# Patient Record
Sex: MALE | Race: WHITE | Employment: OTHER | URBAN - METROPOLITAN AREA
[De-identification: names, ages, dates, MRNs, and addresses within clinical notes are randomized per-mention and may not be internally consistent; named-entity substitution may affect disease eponyms.]

---

## 2021-08-27 ENCOUNTER — HOSPITAL ENCOUNTER (INPATIENT)
Age: 71
LOS: 10 days | Discharge: HOME | DRG: 177 | End: 2021-09-06
Attending: EMERGENCY MEDICINE | Admitting: HOSPITALIST
Payer: MEDICARE

## 2021-08-27 ENCOUNTER — APPOINTMENT (OUTPATIENT)
Dept: GENERAL RADIOLOGY | Age: 71
DRG: 177 | End: 2021-08-27
Attending: EMERGENCY MEDICINE
Payer: MEDICARE

## 2021-08-27 DIAGNOSIS — D69.6 THROMBOCYTOPENIA (HCC): ICD-10-CM

## 2021-08-27 DIAGNOSIS — R09.02 HYPOXIA: ICD-10-CM

## 2021-08-27 DIAGNOSIS — U07.1 PNEUMONIA DUE TO COVID-19 VIRUS: Primary | ICD-10-CM

## 2021-08-27 DIAGNOSIS — J12.82 PNEUMONIA DUE TO COVID-19 VIRUS: Primary | ICD-10-CM

## 2021-08-27 PROBLEM — J96.01 ACUTE RESPIRATORY FAILURE WITH HYPOXIA (HCC): Status: ACTIVE | Noted: 2021-08-27

## 2021-08-27 PROBLEM — I10 HYPERTENSION: Status: ACTIVE | Noted: 2021-08-27

## 2021-08-27 LAB
ALBUMIN SERPL-MCNC: 2.5 G/DL (ref 3.2–4.6)
ALBUMIN/GLOB SERPL: 0.6 {RATIO} (ref 1.2–3.5)
ALP SERPL-CCNC: 54 U/L (ref 50–136)
ALT SERPL-CCNC: 24 U/L (ref 12–65)
ANION GAP SERPL CALC-SCNC: 10 MMOL/L (ref 7–16)
AST SERPL-CCNC: 46 U/L (ref 15–37)
BASOPHILS # BLD: 0 K/UL (ref 0–0.2)
BASOPHILS NFR BLD: 0 % (ref 0–2)
BILIRUB SERPL-MCNC: 0.4 MG/DL (ref 0.2–1.1)
BUN SERPL-MCNC: 15 MG/DL (ref 8–23)
CALCIUM SERPL-MCNC: 8.7 MG/DL (ref 8.3–10.4)
CHLORIDE SERPL-SCNC: 97 MMOL/L (ref 98–107)
CO2 SERPL-SCNC: 25 MMOL/L (ref 21–32)
CREAT SERPL-MCNC: 0.97 MG/DL (ref 0.8–1.5)
D DIMER PPP FEU-MCNC: 1.72 UG/ML(FEU)
DIFFERENTIAL METHOD BLD: ABNORMAL
EOSINOPHIL # BLD: 0 K/UL (ref 0–0.8)
EOSINOPHIL NFR BLD: 0 % (ref 0.5–7.8)
ERYTHROCYTE [DISTWIDTH] IN BLOOD BY AUTOMATED COUNT: 12.6 % (ref 11.9–14.6)
GLOBULIN SER CALC-MCNC: 4.1 G/DL (ref 2.3–3.5)
GLUCOSE SERPL-MCNC: 239 MG/DL (ref 65–100)
HCT VFR BLD AUTO: 44.2 % (ref 41.1–50.3)
HGB BLD-MCNC: 15.2 G/DL (ref 13.6–17.2)
IMM GRANULOCYTES # BLD AUTO: 0 K/UL (ref 0–0.5)
IMM GRANULOCYTES NFR BLD AUTO: 1 % (ref 0–5)
LACTATE SERPL-SCNC: 2.3 MMOL/L (ref 0.4–2)
LYMPHOCYTES # BLD: 0.5 K/UL (ref 0.5–4.6)
LYMPHOCYTES NFR BLD: 8 % (ref 13–44)
MAGNESIUM SERPL-MCNC: 1.7 MG/DL (ref 1.8–2.4)
MCH RBC QN AUTO: 29.5 PG (ref 26.1–32.9)
MCHC RBC AUTO-ENTMCNC: 34.4 G/DL (ref 31.4–35)
MCV RBC AUTO: 85.8 FL (ref 79.6–97.8)
MONOCYTES # BLD: 0.1 K/UL (ref 0.1–1.3)
MONOCYTES NFR BLD: 2 % (ref 4–12)
NEUTS SEG # BLD: 6.5 K/UL (ref 1.7–8.2)
NEUTS SEG NFR BLD: 90 % (ref 43–78)
NRBC # BLD: 0 K/UL (ref 0–0.2)
PLATELET # BLD AUTO: 26 K/UL (ref 150–450)
PMV BLD AUTO: 11.6 FL (ref 9.4–12.3)
POTASSIUM SERPL-SCNC: 3.5 MMOL/L (ref 3.5–5.1)
PROT SERPL-MCNC: 6.6 G/DL (ref 6.3–8.2)
RBC # BLD AUTO: 5.15 M/UL (ref 4.23–5.6)
SODIUM SERPL-SCNC: 132 MMOL/L (ref 136–145)
TROPONIN-HIGH SENSITIVITY: 48.3 PG/ML (ref 0–14)
WBC # BLD AUTO: 7.2 K/UL (ref 4.3–11.1)

## 2021-08-27 PROCEDURE — 96374 THER/PROPH/DIAG INJ IV PUSH: CPT

## 2021-08-27 PROCEDURE — 85025 COMPLETE CBC W/AUTO DIFF WBC: CPT

## 2021-08-27 PROCEDURE — 85379 FIBRIN DEGRADATION QUANT: CPT

## 2021-08-27 PROCEDURE — 84484 ASSAY OF TROPONIN QUANT: CPT

## 2021-08-27 PROCEDURE — 74011250636 HC RX REV CODE- 250/636: Performed by: EMERGENCY MEDICINE

## 2021-08-27 PROCEDURE — 83605 ASSAY OF LACTIC ACID: CPT

## 2021-08-27 PROCEDURE — 86738 MYCOPLASMA ANTIBODY: CPT

## 2021-08-27 PROCEDURE — 93005 ELECTROCARDIOGRAM TRACING: CPT | Performed by: EMERGENCY MEDICINE

## 2021-08-27 PROCEDURE — XW033E5 INTRODUCTION OF REMDESIVIR ANTI-INFECTIVE INTO PERIPHERAL VEIN, PERCUTANEOUS APPROACH, NEW TECHNOLOGY GROUP 5: ICD-10-PCS | Performed by: HOSPITALIST

## 2021-08-27 PROCEDURE — 80053 COMPREHEN METABOLIC PANEL: CPT

## 2021-08-27 PROCEDURE — 71045 X-RAY EXAM CHEST 1 VIEW: CPT

## 2021-08-27 PROCEDURE — 65270000029 HC RM PRIVATE

## 2021-08-27 PROCEDURE — 83735 ASSAY OF MAGNESIUM: CPT

## 2021-08-27 PROCEDURE — 99285 EMERGENCY DEPT VISIT HI MDM: CPT

## 2021-08-27 RX ORDER — ACETAMINOPHEN 325 MG/1
650 TABLET ORAL
Status: DISCONTINUED | OUTPATIENT
Start: 2021-08-27 | End: 2021-09-06 | Stop reason: HOSPADM

## 2021-08-27 RX ORDER — SODIUM CHLORIDE 0.9 % (FLUSH) 0.9 %
5-40 SYRINGE (ML) INJECTION EVERY 8 HOURS
Status: DISCONTINUED | OUTPATIENT
Start: 2021-08-27 | End: 2021-09-06 | Stop reason: HOSPADM

## 2021-08-27 RX ORDER — INSULIN LISPRO 100 [IU]/ML
INJECTION, SOLUTION INTRAVENOUS; SUBCUTANEOUS EVERY 4 HOURS
Status: DISCONTINUED | OUTPATIENT
Start: 2021-08-27 | End: 2021-08-28

## 2021-08-27 RX ORDER — DEXAMETHASONE SODIUM PHOSPHATE 100 MG/10ML
6 INJECTION INTRAMUSCULAR; INTRAVENOUS
Status: COMPLETED | OUTPATIENT
Start: 2021-08-27 | End: 2021-08-27

## 2021-08-27 RX ORDER — POLYETHYLENE GLYCOL 3350 17 G/17G
17 POWDER, FOR SOLUTION ORAL DAILY PRN
Status: DISCONTINUED | OUTPATIENT
Start: 2021-08-27 | End: 2021-09-06 | Stop reason: HOSPADM

## 2021-08-27 RX ORDER — SODIUM CHLORIDE 0.9 % (FLUSH) 0.9 %
5-40 SYRINGE (ML) INJECTION AS NEEDED
Status: DISCONTINUED | OUTPATIENT
Start: 2021-08-27 | End: 2021-09-06 | Stop reason: HOSPADM

## 2021-08-27 RX ORDER — DEXAMETHASONE SODIUM PHOSPHATE 100 MG/10ML
6 INJECTION INTRAMUSCULAR; INTRAVENOUS EVERY 24 HOURS
Status: DISCONTINUED | OUTPATIENT
Start: 2021-08-28 | End: 2021-08-28

## 2021-08-27 RX ORDER — DEXAMETHASONE SODIUM PHOSPHATE 100 MG/10ML
6 INJECTION INTRAMUSCULAR; INTRAVENOUS EVERY 24 HOURS
Status: DISCONTINUED | OUTPATIENT
Start: 2021-08-27 | End: 2021-08-27

## 2021-08-27 RX ORDER — ONDANSETRON 8 MG/1
4 TABLET, ORALLY DISINTEGRATING ORAL
Status: DISCONTINUED | OUTPATIENT
Start: 2021-08-27 | End: 2021-09-06 | Stop reason: HOSPADM

## 2021-08-27 RX ORDER — ACETAMINOPHEN 650 MG/1
650 SUPPOSITORY RECTAL
Status: DISCONTINUED | OUTPATIENT
Start: 2021-08-27 | End: 2021-09-06 | Stop reason: HOSPADM

## 2021-08-27 RX ORDER — ONDANSETRON 2 MG/ML
4 INJECTION INTRAMUSCULAR; INTRAVENOUS
Status: DISCONTINUED | OUTPATIENT
Start: 2021-08-27 | End: 2021-09-06 | Stop reason: HOSPADM

## 2021-08-27 RX ADMIN — DEXAMETHASONE SODIUM PHOSPHATE 6 MG: 10 INJECTION INTRAMUSCULAR; INTRAVENOUS at 22:01

## 2021-08-28 PROBLEM — E11.9 DM (DIABETES MELLITUS) (HCC): Status: ACTIVE | Noted: 2021-08-28

## 2021-08-28 LAB
ATRIAL RATE: 104 BPM
CALCULATED P AXIS, ECG09: 32 DEGREES
CALCULATED R AXIS, ECG10: -41 DEGREES
CALCULATED T AXIS, ECG11: 22 DEGREES
DIAGNOSIS, 93000: NORMAL
FERRITIN SERPL-MCNC: 1546 NG/ML (ref 8–388)
GLUCOSE BLD STRIP.AUTO-MCNC: 276 MG/DL (ref 65–100)
GLUCOSE BLD STRIP.AUTO-MCNC: 290 MG/DL (ref 65–100)
GLUCOSE BLD STRIP.AUTO-MCNC: 311 MG/DL (ref 65–100)
GLUCOSE BLD STRIP.AUTO-MCNC: 330 MG/DL (ref 65–100)
GLUCOSE BLD STRIP.AUTO-MCNC: 337 MG/DL (ref 65–100)
GLUCOSE BLD STRIP.AUTO-MCNC: 361 MG/DL (ref 65–100)
LACTATE SERPL-SCNC: 1.9 MMOL/L (ref 0.4–2)
P-R INTERVAL, ECG05: 154 MS
PROCALCITONIN SERPL-MCNC: 2.01 NG/ML
Q-T INTERVAL, ECG07: 364 MS
QRS DURATION, ECG06: 92 MS
QTC CALCULATION (BEZET), ECG08: 478 MS
SERVICE CMNT-IMP: ABNORMAL
TROPONIN-HIGH SENSITIVITY: 54.8 PG/ML (ref 0–14)
VENTRICULAR RATE, ECG03: 104 BPM

## 2021-08-28 PROCEDURE — 65270000029 HC RM PRIVATE

## 2021-08-28 PROCEDURE — 74011000258 HC RX REV CODE- 258: Performed by: HOSPITALIST

## 2021-08-28 PROCEDURE — 74011636637 HC RX REV CODE- 636/637: Performed by: HOSPITALIST

## 2021-08-28 PROCEDURE — 86738 MYCOPLASMA ANTIBODY: CPT

## 2021-08-28 PROCEDURE — 82728 ASSAY OF FERRITIN: CPT

## 2021-08-28 PROCEDURE — 84145 PROCALCITONIN (PCT): CPT

## 2021-08-28 PROCEDURE — 74011250636 HC RX REV CODE- 250/636: Performed by: HOSPITALIST

## 2021-08-28 PROCEDURE — 74011000250 HC RX REV CODE- 250: Performed by: HOSPITALIST

## 2021-08-28 PROCEDURE — 74011636637 HC RX REV CODE- 636/637: Performed by: NURSE PRACTITIONER

## 2021-08-28 PROCEDURE — 82962 GLUCOSE BLOOD TEST: CPT

## 2021-08-28 PROCEDURE — 36415 COLL VENOUS BLD VENIPUNCTURE: CPT

## 2021-08-28 PROCEDURE — 77010033678 HC OXYGEN DAILY

## 2021-08-28 PROCEDURE — 94760 N-INVAS EAR/PLS OXIMETRY 1: CPT

## 2021-08-28 RX ORDER — INSULIN LISPRO 100 [IU]/ML
INJECTION, SOLUTION INTRAVENOUS; SUBCUTANEOUS
Status: DISCONTINUED | OUTPATIENT
Start: 2021-08-28 | End: 2021-09-06 | Stop reason: HOSPADM

## 2021-08-28 RX ORDER — ASCORBIC ACID 500 MG
1000 TABLET ORAL 3 TIMES DAILY
COMMUNITY

## 2021-08-28 RX ORDER — DEXAMETHASONE 4 MG/1
6 TABLET ORAL DAILY
Status: DISCONTINUED | OUTPATIENT
Start: 2021-08-29 | End: 2021-08-30

## 2021-08-28 RX ORDER — METFORMIN HYDROCHLORIDE 500 MG/1
500 TABLET ORAL 2 TIMES DAILY WITH MEALS
Status: ON HOLD | COMMUNITY
End: 2021-09-06 | Stop reason: SDUPTHER

## 2021-08-28 RX ORDER — INSULIN GLARGINE 100 [IU]/ML
15 INJECTION, SOLUTION SUBCUTANEOUS
Status: DISCONTINUED | OUTPATIENT
Start: 2021-08-28 | End: 2021-08-29

## 2021-08-28 RX ADMIN — Medication 10 ML: at 01:32

## 2021-08-28 RX ADMIN — REMDESIVIR 200 MG: 100 INJECTION, POWDER, LYOPHILIZED, FOR SOLUTION INTRAVENOUS at 01:31

## 2021-08-28 RX ADMIN — CEFTRIAXONE 2 G: 2 INJECTION, POWDER, FOR SOLUTION INTRAMUSCULAR; INTRAVENOUS at 01:16

## 2021-08-28 RX ADMIN — Medication 10 ML: at 14:04

## 2021-08-28 RX ADMIN — DOXYCYCLINE 100 MG: 100 INJECTION, POWDER, LYOPHILIZED, FOR SOLUTION INTRAVENOUS at 14:04

## 2021-08-28 RX ADMIN — DOXYCYCLINE 100 MG: 100 INJECTION, POWDER, LYOPHILIZED, FOR SOLUTION INTRAVENOUS at 02:28

## 2021-08-28 RX ADMIN — INSULIN LISPRO 8 UNITS: 100 INJECTION, SOLUTION INTRAVENOUS; SUBCUTANEOUS at 12:24

## 2021-08-28 RX ADMIN — DEXAMETHASONE SODIUM PHOSPHATE 6 MG: 10 INJECTION, SOLUTION INTRAMUSCULAR; INTRAVENOUS at 08:27

## 2021-08-28 RX ADMIN — INSULIN LISPRO 8 UNITS: 100 INJECTION, SOLUTION INTRAVENOUS; SUBCUTANEOUS at 08:30

## 2021-08-28 RX ADMIN — INSULIN LISPRO 6 UNITS: 100 INJECTION, SOLUTION INTRAVENOUS; SUBCUTANEOUS at 04:25

## 2021-08-28 RX ADMIN — INSULIN LISPRO 6 UNITS: 100 INJECTION, SOLUTION INTRAVENOUS; SUBCUTANEOUS at 01:30

## 2021-08-28 RX ADMIN — CEFTRIAXONE 2 G: 2 INJECTION, POWDER, FOR SOLUTION INTRAMUSCULAR; INTRAVENOUS at 22:20

## 2021-08-28 RX ADMIN — INSULIN LISPRO 8 UNITS: 100 INJECTION, SOLUTION INTRAVENOUS; SUBCUTANEOUS at 23:14

## 2021-08-28 RX ADMIN — Medication 10 ML: at 05:54

## 2021-08-28 RX ADMIN — REMDESIVIR 100 MG: 100 INJECTION, POWDER, LYOPHILIZED, FOR SOLUTION INTRAVENOUS at 00:00

## 2021-08-28 RX ADMIN — INSULIN GLARGINE 15 UNITS: 100 INJECTION, SOLUTION SUBCUTANEOUS at 22:18

## 2021-08-28 RX ADMIN — REMDESIVIR 100 MG: 100 INJECTION, POWDER, LYOPHILIZED, FOR SOLUTION INTRAVENOUS at 23:18

## 2021-08-28 RX ADMIN — Medication 10 ML: at 21:44

## 2021-08-28 RX ADMIN — INSULIN LISPRO 10 UNITS: 100 INJECTION, SOLUTION INTRAVENOUS; SUBCUTANEOUS at 17:34

## 2021-08-28 NOTE — PROGRESS NOTES
Pt arrived to unit via stretcher from ED. Condition stable. Pt alert and oriented to person. Disoriented to place or time. Staff oriented patient to place, time, and situation. Plan of care reviewed with patient. Pt agrees to plan of care, verbalizing understanding with reinforcement. Assessment completed, see flowsheet. Admission criteria reviewed. Call light placed in reach. Bed low and locked. Pt denies any needs at this time. Will continue to monitor.

## 2021-08-28 NOTE — PROGRESS NOTES
TRANSFER - IN REPORT:    Verbal report received from Rupinder Mcneil RN on Prabha Faustin  being received from ED for routine progression of care      Report consisted of patients Situation, Background, Assessment and   Recommendations(SBAR). Information from the following report(s) Kardex and ED Summary was reviewed with the receiving nurse. Opportunity for questions and clarification was provided. Assessment completed upon patients arrival to unit and care assumed.

## 2021-08-28 NOTE — PROGRESS NOTES
08/28/21 0502   Dual Skin Pressure Injury Assessment   Dual Skin Pressure Injury Assessment X   Second Care Provider (Based on [address] [place] [address])   (Jakob Díaz, RN)   Buttocks/Ishium  Bilateral   Skin Integumentary   Skin Integumentary (WDL) X    Pressure  Injury Documentation No Pressure Injury Noted-Pressure Ulcer Prevention Initiated   Skin Color Appropriate for ethnicity   Skin Condition/Temp Warm   Turgor Epidermis thin w/ loss of subcut tissue   Hair Growth Present   Nails X   Exceptions to WDL Mycotic left;Mycotic right

## 2021-08-28 NOTE — ED NOTES
TRANSFER - OUT REPORT:    Verbal report given to Deepti(name) on Cldonald Webb  being transferred to 603(unit) for routine progression of care       Report consisted of patients Situation, Background, Assessment and   Recommendations(SBAR). Information from the following report(s) SBAR, MAR and Recent Results was reviewed with the receiving nurse. Lines:   Peripheral IV 08/27/21 Right Antecubital (Active)        Opportunity for questions and clarification was provided.       Patient transported with:   O2 @ 6 liters

## 2021-08-28 NOTE — ED NOTES
Pt arrives via EMS [facility] with a CC of ShoB and generalized weakness. Tested positive for Covid 4 days ago. This morning woke with fatigue. Found with o2 sat 85% with wheezing. En route given 1g Rocephin, and 4L NC. Arrives 96% on RA.

## 2021-08-28 NOTE — PROGRESS NOTES
INITIAL SPIRITUAL ASSESSMENT     NOTED:    No doni preference    Spouse - kiara    Lives in [facility]    Full code    No directives on file    High risk for decline          WILL ASSESS HOW WE CAN BEST SERVE THIS FAMILY

## 2021-08-28 NOTE — PROGRESS NOTES
08/28/21 0502   Dual Skin Pressure Injury Assessment   Dual Skin Pressure Injury Assessment X   Second Care Provider (Based on [address] [place] [address])   (Ngoc Crisostomo RN)   Buttocks/Ishium  Bilateral  (red, blanchable to bilateral buttocks)   Skin Integumentary   Skin Integumentary (WDL) X    Pressure  Injury Documentation No Pressure Injury Noted-Pressure Ulcer Prevention Initiated   Skin Color Appropriate for ethnicity   Skin Condition/Temp Warm   Turgor Epidermis thin w/ loss of subcut tissue   Hair Growth Present   Nails X   Exceptions to WDL Mycotic left;Mycotic right   Wound Prevention and Protection Methods   Orientation of Wound Prevention Posterior   Location of Wound Prevention Buttocks; Sacrum/Coccyx   Dressing Present  No   Wound Offloading (Prevention Methods) Repositioning;Pillows   skin intact

## 2021-08-28 NOTE — ED PROVIDER NOTES
58-year-old male has been sick with cough, congestion, fevers, nausea, vomiting, and diarrhea for the past week. He was diagnosed with Covid 4 days ago [place] [facility] He has worsening shortness of breath and generalized weakness. EMS noted sats of 85% upon arrival.  He is not on home oxygen. He is between 92 and 96% on 4 L. Given Rocephin by EMS. Denies any underlying lung or heart disease. He has received Covid vaccine. Shortness of Breath  Associated symptoms include a fever, headaches, cough, chest pain, vomiting and abdominal pain. Pertinent negatives include no rash. No past medical history on file. No past surgical history on file. No family history on file. Social History     Socioeconomic History    Marital status: Not on file     Spouse name: Not on file    Number of children: Not on file    Years of education: Not on file    Highest education level: Not on file   Occupational History    Not on file   Tobacco Use    Smoking status: Not on file   Substance and Sexual Activity    Alcohol use: Not on file    Drug use: Not on file    Sexual activity: Not on file   Other Topics Concern    Not on file   Social History Narrative    Not on file     Social Determinants of Health     Financial Resource Strain:     Difficulty of Paying Living Expenses:    Food Insecurity:     Worried About Running Out of Food in the Last Year:     [address] in the Last Year:    Transportation Needs:     Lack of Transportation (Medical):      Lack of Transportation (Non-Medical):    Physical Activity:     Days of Exercise per Week:     Minutes of Exercise per Session:    Stress:     Feeling of Stress :    Social Connections:     Frequency of Communication with Friends and Family:     Frequency of Social Gatherings with Friends and Family:     Attends Taoist Services:     Active Member of Clubs or Organizations:     Attends Club or Organization Meetings:     Marital Status:    Intimate Partner Violence:     Fear of Current or Ex-Partner:     Emotionally Abused:     Physically Abused:     Sexually Abused: ALLERGIES: Patient has no known allergies. Review of Systems   Constitutional: Positive for activity change, appetite change, chills, fatigue and fever. HENT: Positive for congestion. Negative for hearing loss. Eyes: Negative for visual disturbance. Respiratory: Positive for cough and shortness of breath. Cardiovascular: Positive for chest pain. Gastrointestinal: Positive for abdominal pain, diarrhea, nausea and vomiting. Musculoskeletal: Positive for arthralgias and myalgias. Skin: Negative for rash. Neurological: Positive for headaches. Psychiatric/Behavioral: Negative for confusion. All other systems reviewed and are negative. Vitals:    08/27/21 2135 08/27/21 2136 08/27/21 2137   BP: 137/68     Pulse: (!) 101 (!) 105 (!) 103   Resp:  25 (!) 32   Temp: 97.4 °F (36.3 °C)     SpO2: 94%  93%   Weight: 95.3 kg (210 lb)     Height: 5' 6\" (1.676 m)              Physical Exam  Vitals and nursing note reviewed. Constitutional:       Appearance: Normal appearance. He is ill-appearing. HENT:      Head: Normocephalic and atraumatic. Nose: Nose normal.      Mouth/Throat:      Mouth: Mucous membranes are moist.   Eyes:      Pupils: Pupils are equal, round, and reactive to light. Cardiovascular:      Rate and Rhythm: Regular rhythm. Tachycardia present. Pulmonary:      Effort: Pulmonary effort is normal. Tachypnea present. Breath sounds: No stridor. Abdominal:      General: Abdomen is flat. Musculoskeletal:         General: No deformity. Normal range of motion. Cervical back: Normal range of motion and neck supple. Skin:     General: Skin is warm and dry. Neurological:      General: No focal deficit present. Mental Status: He is alert. Mental status is at baseline.    Psychiatric:         Mood and Affect: Mood normal.         Behavior: Behavior normal.          MDM  Number of Diagnoses or Management Options  Diagnosis management comments: Parts of this document were created using dragon voice recognition software. The chart has been reviewed but errors may still be present. I wore appropriate PPE throughout this patient's ED visit. Mike Mcmahan MD, 9:46 PM    Given decadron.  Will admit       Amount and/or Complexity of Data Reviewed  Clinical lab tests: reviewed and ordered  Tests in the radiology section of CPT®: ordered and reviewed  Tests in the medicine section of CPT®: ordered and reviewed           EKG    Date/Time: 8/27/2021 9:46 PM  Performed by: Barbara De Paz MD  Authorized by: Barbara De Paz MD     ECG reviewed by ED Physician in the absence of a cardiologist: yes    Interpretation:     Interpretation: abnormal    Rate:     ECG rate:  104    ECG rate assessment: tachycardic    Rhythm:     Rhythm: sinus tachycardia    Ectopy:     Ectopy: PAC    Conduction:     Conduction: normal    ST segments:     ST segments:  Non-specific

## 2021-08-28 NOTE — PROGRESS NOTES
[facility] Hospitalist Progress Note     Name:  Chaneta Pallas  Age:70 y.o. Sex:male   :  1950    MRN:  [id number]     Admit Date:  2021    Reason for Admission:  Pneumonia due to COVID-19 virus [U07.1, J12.82]  Hypertension [I10]    Hospital Course/Interval history:     69year old CM PMH DM, HTN has no PCP admitted  for acute respiratory failure with hypoxia. He was dx with COVID 19  at outpatient. Per EMS, he was noted with saturations mid 80s, does not require oxygen at baseline. He has had 1 vaccine with 2nd due next week. Subjective (21): Alert and oriented x3. Oxygen weaned down to 3 liters by this provider, saturations mid 90s. Afebrile. Platelets 26. **Joe Ott, spouse, called with update. All questions answered. Assessment & Plan     1. Acute respiratory failure with hypoxia related to COVID 19 infection:  Dx , requiring oxygen which is new for him  Started on Rocephin and Doxycycline on admission, will check procal, leigionella and mycoplasma, strep pending. With de-escalate ABT if above negative  Wean oxygen as able  Dexamethasone 6 mg oral daily  Day 1 of Remdesivir    2. DM2:  A1C on  from doctor office noted 12  SSI   Adding Lantus insulin 15 units nightly. *Patient on Dexamethasone so expect increase in BS    3. Essential HTN:  Well controlled, not on any medications. Trend    4. Thrombocytopenia:  Noted 26  On  noted 98  Likely related to [place] 19 infetion  Patient states having a H/O same over 10 years ago, will check peripheral smear  Trend for now, transfuse if less than 10        Diet:  DIET ADULT ORAL NUTRITION SUPPLEMENT  DIET ADULT  DVT PPx: Lovenox SQ  Code status: Full Code  Disposition/Expected LOS: 1-2 days          Review of Systems: 14 point review of systems is otherwise negative with the exception of the elements mentioned above.     Objective:     Patient Vitals for the past 24 hrs:   Temp Pulse Resp BP SpO2   21 1114 (!) 96 °F (35.6 °C) 75 24 113/77 91 %   08/28/21 0717 (!) 96.2 °F (35.7 °C) 70 24 122/76 93 %   08/28/21 0350 98.4 °F (36.9 °C) 74 20 114/74 91 %   08/28/21 0021 98.5 °F (36.9 °C) 90 19 129/79 93 %   08/27/21 2331  94 19 116/70 92 %   08/27/21 2216  (!) 109 (!) 32 138/66 92 %   08/27/21 2137  (!) 103 (!) 32  93 %   08/27/21 2136  (!) 105 25     08/27/21 2135 97.4 °F (36.3 °C) (!) 101  137/68 94 %     Oxygen Therapy  O2 Sat (%): 91 % (08/28/21 1114)  Pulse via Oximetry: 91 beats per minute (08/27/21 2331)  O2 Device: Nasal cannula (08/28/21 0720)  Skin Assessment: Clean, dry, & intact (08/28/21 0350)  O2 Flow Rate (L/min): 5 l/min (08/28/21 0720)    Body mass index is 33.89 kg/m². Physical Exam:   General:  No acute distress, speaking in full sentences, no use of accessory muscles   Lungs:  Diminished lower lobes bilat  CV:  Regular rate and rhythm with normal S1 and S2   Abdomen:  Soft, nontender, nondistended, normoactive bowel sounds   Extremities:  No cyanosis clubbing or edema   Neuro:  Nonfocal, A&O x3   Psych:  Normal affect     Data Review:  I have reviewed all labs, meds, and studies from the last 24 hours:    Labs:    Recent Results (from the past 24 hour(s))   EKG, 12 LEAD, INITIAL    Collection Time: 08/27/21  9:37 PM   Result Value Ref Range    Ventricular Rate 104 BPM    Atrial Rate 104 BPM    P-R Interval 154 ms    QRS Duration 92 ms    Q-T Interval 364 ms    QTC Calculation (Bezet) 478 ms    Calculated P Axis 32 degrees    Calculated R Axis -41 degrees    Calculated T Axis 22 degrees    Diagnosis       !! AGE AND GENDER SPECIFIC ECG ANALYSIS !!   Sinus tachycardia with Premature atrial complexes  Left axis deviation  Pulmonary disease pattern  Abnormal ECG  No previous ECGs available  Confirmed by ST JOSHUA CAROLINA MD (), WILL VAZQUEZ ([id number]) on 8/28/2021 10:13:14 AM     CBC WITH AUTOMATED DIFF    Collection Time: 08/27/21 10:04 PM   Result Value Ref Range    WBC 7.2 4.3 - 11.1 K/uL    RBC 5.15 4.23 - 5.6 M/uL    HGB 15.2 13.6 - 17.2 g/dL    HCT 44.2 41.1 - 50.3 %    MCV 85.8 79.6 - 97.8 FL    MCH 29.5 26.1 - 32.9 PG    MCHC 34.4 31.4 - 35.0 g/dL    RDW 12.6 11.9 - 14.6 %    PLATELET 26 (LL) 786 - 450 K/uL    MPV 11.6 9.4 - 12.3 FL    ABSOLUTE NRBC 0.00 0.0 - 0.2 K/uL    DF AUTOMATED      NEUTROPHILS 90 (H) 43 - 78 %    LYMPHOCYTES 8 (L) 13 - 44 %    MONOCYTES 2 (L) 4.0 - 12.0 %    EOSINOPHILS 0 (L) 0.5 - 7.8 %    BASOPHILS 0 0.0 - 2.0 %    IMMATURE GRANULOCYTES 1 0.0 - 5.0 %    ABS. NEUTROPHILS 6.5 1.7 - 8.2 K/UL    ABS. LYMPHOCYTES 0.5 0.5 - 4.6 K/UL    ABS. MONOCYTES 0.1 0.1 - 1.3 K/UL    ABS. EOSINOPHILS 0.0 0.0 - 0.8 K/UL    ABS. BASOPHILS 0.0 0.0 - 0.2 K/UL    ABS. IMM. GRANS. 0.0 0.0 - 0.5 K/UL   METABOLIC PANEL, COMPREHENSIVE    Collection Time: 08/27/21 10:04 PM   Result Value Ref Range    Sodium 132 (L) 136 - 145 mmol/L    Potassium 3.5 3.5 - 5.1 mmol/L    Chloride 97 (L) 98 - 107 mmol/L    CO2 25 21 - 32 mmol/L    Anion gap 10 7 - 16 mmol/L    Glucose 239 (H) 65 - 100 mg/dL    BUN 15 8 - 23 MG/DL    Creatinine 0.97 0.8 - 1.5 MG/DL    GFR est AA >60 >60 ml/min/1.73m2    GFR est non-AA >60 >60 ml/min/1.73m2    Calcium 8.7 8.3 - 10.4 MG/DL    Bilirubin, total 0.4 0.2 - 1.1 MG/DL    ALT (SGPT) 24 12 - 65 U/L    AST (SGOT) 46 (H) 15 - 37 U/L    Alk.  phosphatase 54 50 - 136 U/L    Protein, total 6.6 6.3 - 8.2 g/dL    Albumin 2.5 (L) 3.2 - 4.6 g/dL    Globulin 4.1 (H) 2.3 - 3.5 g/dL    A-G Ratio 0.6 (L) 1.2 - 3.5     MAGNESIUM    Collection Time: 08/27/21 10:04 PM   Result Value Ref Range    Magnesium 1.7 (L) 1.8 - 2.4 mg/dL   D DIMER    Collection Time: 08/27/21 10:04 PM   Result Value Ref Range    D DIMER 1.72 (H) <0.56 ug/ml(FEU)   LACTIC ACID    Collection Time: 08/27/21 10:04 PM   Result Value Ref Range    Lactic acid 2.3 (H) 0.4 - 2.0 MMOL/L   TROPONIN-HIGH SENSITIVITY    Collection Time: 08/27/21 10:04 PM   Result Value Ref Range    Troponin-High Sensitivity 48.3 (H) 0 - 14 pg/mL   LACTIC ACID Collection Time: 08/27/21 11:25 PM   Result Value Ref Range    Lactic acid 1.9 0.4 - 2.0 MMOL/L   TROPONIN-HIGH SENSITIVITY    Collection Time: 08/27/21 11:25 PM   Result Value Ref Range    Troponin-High Sensitivity 54.8 (H) 0 - 14 pg/mL   GLUCOSE, POC    Collection Time: 08/28/21  1:22 AM   Result Value Ref Range    Glucose (POC) 290 (H) 65 - 100 mg/dL    Performed by Sandhya Jaimes    GLUCOSE, POC    Collection Time: 08/28/21  3:49 AM   Result Value Ref Range    Glucose (POC) 276 (H) 65 - 100 mg/dL    Performed by Aaron Muro    Collection Time: 08/28/21  6:20 AM   Result Value Ref Range    Ferritin 1,546 (H) 8 - 388 NG/ML   GLUCOSE, POC    Collection Time: 08/28/21  8:04 AM   Result Value Ref Range    Glucose (POC) 337 (H) 65 - 100 mg/dL    Performed by Lucía        All Micro Results     Procedure Component Value Units Date/Time    Bienvenido Zaldivar, IGM [[id number]] Collected: 08/28/21 1550    Order Status: Completed Updated: 08/28/21 0644    MYCOPLASMA AB, IGM [[id number]] Collected: 08/27/21 2325    Order Status: Canceled Specimen: Serum     ASHVIN OsbornPati Mayo, UR/CSF [[id number]]     Order Status: Sent     LEGIONELLA PNEUMOPHILA Lianne Hartman URINE [[id number]]     Order Status: Sent Specimen: Urine           EKG Results     Procedure 720 Value Units Date/Time    EKG, 12 LEAD, INITIAL [[id number]] Collected: 08/27/21 2137    Order Status: Completed Updated: 08/28/21 1013     Ventricular Rate 104 BPM      Atrial Rate 104 BPM      P-R Interval 154 ms      QRS Duration 92 ms      Q-T Interval 364 ms      QTC Calculation (Bezet) 478 ms      Calculated P Axis 32 degrees      Calculated R Axis -41 degrees      Calculated T Axis 22 degrees      Diagnosis --     !! AGE AND GENDER SPECIFIC ECG ANALYSIS !!   Sinus tachycardia with Premature atrial complexes  Left axis deviation  Pulmonary disease pattern  Abnormal ECG  No previous ECGs available  Confirmed by ST JOSHUA CAROLINA MD (), WILL VAZQUEZ ([id number]) on 8/28/2021 10:13:14 AM            Other Studies:  XR CHEST PORT    Result Date: 8/27/2021  CHEST X-RAY, single portable view  8/27/2021 History: Chest pain. Technique: Single frontal view of the chest. Comparison: None. Findings: The cardiac silhouette is mildly enlarged. The lungs are expanded without evidence for pneumothorax. Multifocal bilateral lung infiltrates are seen. These are nonspecific. No significant associated pleural effusion is seen. 1.  Nonspecific bilateral lung infiltrates. Given the associated cardiomegaly, these could represent pulmonary edema. Additional etiologies are not excluded if suggested by clinical findings. This report was made using voice transcription. Despite my best efforts to avoid any, transcription errors may persist. If there is any question about the accuracy of the report or need for clarification, then please call [phone], or text me through [email or web address] for clarification or correction.        Current Meds:   Current Facility-Administered Medications   Medication Dose Route Frequency    insulin lispro (HUMALOG) injection   SubCUTAneous AC&HS    insulin glargine (LANTUS) injection 15 Units  15 Units SubCUTAneous QHS    [START ON 8/29/2021] dexAMETHasone (DECADRON) tablet 6 mg  6 mg Oral DAILY    sodium chloride (NS) flush 5-40 mL  5-40 mL IntraVENous Q8H    sodium chloride (NS) flush 5-40 mL  5-40 mL IntraVENous PRN    acetaminophen (TYLENOL) tablet 650 mg  650 mg Oral Q6H PRN    Or    acetaminophen (TYLENOL) suppository 650 mg  650 mg Rectal Q6H PRN    polyethylene glycol (MIRALAX) packet 17 g  17 g Oral DAILY PRN    ondansetron (ZOFRAN ODT) tablet 4 mg  4 mg Oral Q8H PRN    Or    ondansetron (ZOFRAN) injection 4 mg  4 mg IntraVENous Q6H PRN    cefTRIAXone (ROCEPHIN) 2 g in 0.9% sodium chloride (MBP/ADV) 50 mL MBP  2 g IntraVENous Q24H    doxycycline (VIBRAMYCIN) 100 mg in 0.9% sodium chloride (MBP/ADV) 100 mL MBP  100 mg IntraVENous Q12H    [START ON 8/29/2021] remdesivir 100 mg in 0.9% sodium chloride 250 mL IVPB  100 mg IntraVENous Q24H       Problem List:  Hospital Problems as of 8/28/2021 Never Reviewed        Codes Class Noted - Resolved POA    DM (diabetes mellitus) ([facility] ICD-10-CM: E11.9  ICD-9-CM: 250.00  8/28/2021 - Present Yes        Hypertension ICD-10-CM: I10  ICD-9-CM: 401.9  8/27/2021 - Present Yes        * (Principal) Pneumonia due to COVID-19 virus ICD-10-CM: U07.1, J12.82  ICD-9-CM: 480.8, 079.89  8/27/2021 - Present Yes        Thrombocytopenia ([facility] ICD-10-CM: D69.6  ICD-9-CM: 287.5  8/27/2021 - Present Yes        Acute respiratory failure with hypoxia [facility]) ICD-10-CM: J96.01  ICD-9-CM: 518.81  8/27/2021 - Present Yes                 Signed By: Mecca Kidd NP   [employer] [facility]    August 28, 2021  5:15 PM

## 2021-08-28 NOTE — PROGRESS NOTES
Spoke with patient's wife Alejandra Mcgowan via telephone. Confirmed patient recently diagnosed with diabetes this week and started on Metformin 500mg bid this week. Pt has had little to no appetite this week and has only taken medication once this week.

## 2021-08-28 NOTE — PROGRESS NOTES
Comprehensive Nutrition Assessment    Type and Reason for Visit: Initial, Positive nutrition screen  Best Practice Alert for Malnutrition Screening Tool: Recently Lost Weight Without Trying: Yes, If Yes, How Much Weight Loss: 2-13 lbs, Eating Poorly Due to Decreased Appetite: Yes    Nutrition Recommendations/Plan:    Modify diet from regular to diabetic 75g   ONT 3x daily to optimize nutrition while IP. Malnutrition Assessment:  Malnutrition Status:Unable to assess due to isolation precaution. Nutrition Assessment:   Nutrition History: PTA reports poor PO intake x 1 week, attributed to poor appetite, \"dull taste\" , \"watery\" stool PTA. States UBW = 582 pounds, uncertain when he was last at this body weight. Nutrition Background: 69year old male with DM, admitted with pneunomia due to COVID-19 virus. No chewing, swallowing difficulties reported. Notes clothing fitting loser. Daily Update:  POC -290, C/o poor appetite, dull taste, BM today states \"loose\", 100% completed fruit cup, orange juice and some coffee for breakfast.   Nutrition Related Findings:   Below baseline PO intake x 1 week, reports clothing feeling loser, poor appetite attributed to foods tasting \"dull\". Wound Type: None; Possible 11% WL.      Current Nutrition Therapies:  ADULT DIET Regular  ADULT ORAL NUTRITION SUPPLEMENT Breakfast, Lunch, Dinner; Diabetic Supplement    Current Intake:   Average Meal Intake: 1-25% (breakfast meal) Average Supplement Intake: 0%      Anthropometric Measures:  Height: 5' 6\" (167.6 cm)  Current Body Wt: 95.3 kg (210 lb), Weight source: Not specified  BMI: 33.9,    Admission Body Weight: 210 lb  Ideal Body Weight (lbs) (Calculated): 142 lbs (65 kg), 147.9 %  Usual Body Wt: 106.6 kg (235 lb) (unsure when he was last at this weight), Percent weight change: -10.6          Edema: No data recorded   Estimated Daily Nutrient Needs:  Energy (kcal/day): 8689-8484 ([place]. Jeor (X 1.2-1.3), Weight Used: Admission)  Protein (g/day):  (0.8-1g/kb BW) Weight Used: (Admission)  Fluid (ml/day):   Per MD or 1ml/kcal.     Nutrition Diagnosis:   · Inadequate oral intake related to altered taste perception as evidenced by intake 0-25%, poor intake prior to admission, diarrhea    Nutrition Interventions:   Food and/or Nutrient Delivery: Continue current diet, Modify oral nutrition supplement  Nutrition Education/Counseling: Education completed  Coordination of Nutrition Care: Continue to monitor while inpatient, Other (specify)  Plan of Care discussed with patient and  team.     Goals:    Active Goal: to complete 2 ONS + 50% PO intake on follow up (pt agreeable to drink at minimum 2 ONS daily)    Nutrition Monitoring and Evaluation:   Behavioral-Environmental Outcomes: None identified  Food/Nutrient Intake Outcomes: Food and nutrient intake, Supplement intake  Physical Signs/Symptoms Outcomes: Weight    Discharge Planning:         Electronically Singed By Rickie Brambila MS RD LD on 8/28/2021 at 11:27 AM  Contact: [phone]      Disaster Mode Active

## 2021-08-28 NOTE — H&P
Hospitalist History and Physical   Admit Date:  2021  9:30 PM   Name:  Betty Borrego   Age:  69 y.o. Sex:  male  :  1950   MRN:  [id number]     Presenting Complaint: Shortness of Breath    Reason(s) for Admission: Pneumonia due to COVID-19 virus [U07.1, J12.82]  Hypertension [I10]     History of Present Illness:   Betty Borrego is a 69 y.o. male with medical history of hypertension presented to emergency room with chief complaint of cough, shortness of breath, fever, nausea, watery bowel movements for past 1 week duration which continued to get worse. Patient was diagnosed Covid 4 days ago and symptoms continue to get worse at home. Initially when EMS saw the patient his saturations are around 85, patient was placed on supplemental oxygen. A dose of Rocephin was given by EMS. Patient reports he got first dose of Covid vaccine, second dose of Covid vaccine supposed to be next week. Denies any recent travel, sick contacts. Labs were ordered in emergency room shows low platelet count. Review of Systems:  10 systems reviewed and negative except as noted in HPI. Assessment & Plan:   Principal Problem:    Pneumonia due to COVID-19 virus (2021)  Initiated on remdesivir, steroids, patient will be admitted for acute hypoxic respiratory failure. Will monitor respiratory status closely. Active Problems:    Acute hypoxic respiratory failure: On supplemental oxygen, monitor respiratory status. Thrombocytopenia: Likely secondary to Covid, no previous labs available to compare, platelet count is around 26. Will monitor for active bleeding, if any further drop in platelets will transfuse. Hypertension (2021)  Blood pressure is in acceptable range, will monitor. BMP and other labs were not available to interpret at this time, emergency room physician was leaving, she assigned this patient to me which need to be followed further.         Disposition/Discharge Planning: Likely home.    Diet: ADULT DIET Regular  VTE ppx: SCD due to thrombocytopenia  Code status: Full Code    Past medical history: History of hypertension. Past surgical history: Patient reports no surgeries. Social history: No history of smoking, no history of alcohol abuse, no history of drug abuse. Family history: History of hypertension runs in family. Social History     Tobacco Use    Smoking status: Not on file   Substance Use Topics    Alcohol use: Not on file      No family history on file. Family history reviewed and noncontributory to patient's acute condition; no relevant family history unless otherwise noted above. There is no immunization history on file for this patient. PTA Medications:  No current outpatient medications    Objective:     Patient Vitals for the past 24 hrs:   Temp Pulse Resp BP SpO2   08/27/21 2216  (!) 109 (!) 32 138/66 92 %   08/27/21 2137  (!) 103 (!) 32  93 %   08/27/21 2136  (!) 105 25     08/27/21 2135 97.4 °F (36.3 °C) (!) 101  137/68 94 %     Oxygen Therapy  O2 Sat (%): 92 % (08/27/21 2216)  Pulse via Oximetry: 87 beats per minute (08/27/21 2216)  O2 Device: Nasal cannula (08/27/21 2135)  O2 Flow Rate (L/min): 4 l/min (08/27/21 2135)    Estimated body mass index is 33.89 kg/m² as calculated from the following:    Height as of this encounter: 5' 6\" (1.676 m). Weight as of this encounter: 95.3 kg (210 lb). No intake or output data in the 24 hours ending 08/27/21 2321      Physical Exam:    General:    In moderate respiratory distress. Head:  Normocephalic, atraumatic  Eyes:  Sclerae appear normal.  Pupils equally round. HENT:  Nares appear normal, no drainage. Moist mucous membranes  Neck:  No restricted ROM. Trachea midline  CV:   RRR. No m/r/g. No JVD  Lungs:   Diffuse rhonchi noted. Abdomen: Bowel sounds present. Soft, nontender, nondistended. Extremities: Warm and dry. No cyanosis or clubbing. No edema. Skin:     No rashes.   Normal turgor. Normal coloration  Neuro:  Cranial nerves II-XII grossly intact. Sensation intact  Psych:  Normal mood and affect. Alert and oriented x3    Data Ordered and Personally Reviewed:    Last 24hr Labs:  Recent Results (from the past 24 hour(s))   EKG, 12 LEAD, INITIAL    Collection Time: 08/27/21  9:37 PM   Result Value Ref Range    Ventricular Rate 104 BPM    Atrial Rate 104 BPM    P-R Interval 154 ms    QRS Duration 92 ms    Q-T Interval 364 ms    QTC Calculation (Bezet) 478 ms    Calculated P Axis 32 degrees    Calculated R Axis -41 degrees    Calculated T Axis 22 degrees    Diagnosis       !! AGE AND GENDER SPECIFIC ECG ANALYSIS !! Sinus tachycardia with Premature atrial complexes  Left axis deviation  Pulmonary disease pattern  Abnormal ECG  No previous ECGs available     CBC WITH AUTOMATED DIFF    Collection Time: 08/27/21 10:04 PM   Result Value Ref Range    WBC 7.2 4.3 - 11.1 K/uL    RBC 5.15 4.23 - 5.6 M/uL    HGB 15.2 13.6 - 17.2 g/dL    HCT 44.2 41.1 - 50.3 %    MCV 85.8 79.6 - 97.8 FL    MCH 29.5 26.1 - 32.9 PG    MCHC 34.4 31.4 - 35.0 g/dL    RDW 12.6 11.9 - 14.6 %    PLATELET 26 (LL) 878 - 450 K/uL    MPV 11.6 9.4 - 12.3 FL    ABSOLUTE NRBC 0.00 0.0 - 0.2 K/uL    DF AUTOMATED      NEUTROPHILS 90 (H) 43 - 78 %    LYMPHOCYTES 8 (L) 13 - 44 %    MONOCYTES 2 (L) 4.0 - 12.0 %    EOSINOPHILS 0 (L) 0.5 - 7.8 %    BASOPHILS 0 0.0 - 2.0 %    IMMATURE GRANULOCYTES 1 0.0 - 5.0 %    ABS. NEUTROPHILS 6.5 1.7 - 8.2 K/UL    ABS. LYMPHOCYTES 0.5 0.5 - 4.6 K/UL    ABS. MONOCYTES 0.1 0.1 - 1.3 K/UL    ABS. EOSINOPHILS 0.0 0.0 - 0.8 K/UL    ABS. BASOPHILS 0.0 0.0 - 0.2 K/UL    ABS. IMM.  GRANS. 0.0 0.0 - 0.5 K/UL   METABOLIC PANEL, COMPREHENSIVE    Collection Time: 08/27/21 10:04 PM   Result Value Ref Range    Sodium 132 (L) 136 - 145 mmol/L    Potassium 3.5 3.5 - 5.1 mmol/L    Chloride 97 (L) 98 - 107 mmol/L    CO2 25 21 - 32 mmol/L    Anion gap 10 7 - 16 mmol/L    Glucose 239 (H) 65 - 100 mg/dL    BUN 15 8 - 23 MG/DL    Creatinine 0.97 0.8 - 1.5 MG/DL    GFR est AA >60 >60 ml/min/1.73m2    GFR est non-AA >60 >60 ml/min/1.73m2    Calcium 8.7 8.3 - 10.4 MG/DL    Bilirubin, total 0.4 0.2 - 1.1 MG/DL    ALT (SGPT) 24 12 - 65 U/L    AST (SGOT) 46 (H) 15 - 37 U/L    Alk. phosphatase 54 50 - 136 U/L    Protein, total 6.6 6.3 - 8.2 g/dL    Albumin 2.5 (L) 3.2 - 4.6 g/dL    Globulin 4.1 (H) 2.3 - 3.5 g/dL    A-G Ratio 0.6 (L) 1.2 - 3.5     MAGNESIUM    Collection Time: 08/27/21 10:04 PM   Result Value Ref Range    Magnesium 1.7 (L) 1.8 - 2.4 mg/dL   LACTIC ACID    Collection Time: 08/27/21 10:04 PM   Result Value Ref Range    Lactic acid 2.3 (H) 0.4 - 2.0 MMOL/L   TROPONIN-HIGH SENSITIVITY    Collection Time: 08/27/21 10:04 PM   Result Value Ref Range    Troponin-High Sensitivity 48.3 (H) 0 - 14 pg/mL       All Micro Results     Procedure Component Value Units Date/Time    MYCOPLASMA AB, IGM [[id number]]     Order Status: Sent Specimen: Serum     ASHVIN Stearns, UR/CSF [[id number]]     Order Status: Sent     LEGIONELLA [facility] URINE [[id number]]     Order Status: Sent Specimen: Urine           Other Studies:  XR CHEST PORT    Result Date: 8/27/2021  CHEST X-RAY, single portable view  8/27/2021 History: Chest pain. Technique: Single frontal view of the chest. Comparison: None. Findings: The cardiac silhouette is mildly enlarged. The lungs are expanded without evidence for pneumothorax. Multifocal bilateral lung infiltrates are seen. These are nonspecific. No significant associated pleural effusion is seen. 1.  Nonspecific bilateral lung infiltrates. Given the associated cardiomegaly, these could represent pulmonary edema. Additional etiologies are not excluded if suggested by clinical findings. This report was made using voice transcription.  Despite my best efforts to avoid any, transcription errors may persist. If there is any question about the accuracy of the report or need for clarification, then please call [phone], or text me through perfectserv for clarification or correction. Medications Administered     dexamethasone (DECADRON) 10 mg/mL injection 6 mg     Admin Date  08/27/2021 Action  Given Dose  6 mg Route  IntraVENous Administered By  Mendel Echevaria, RN                  Signed:  Sergei Cai MD    Part of this note may have been written by using a voice dictation software. The note has been proof read but may still contain some grammatical/other typographical errors.

## 2021-08-28 NOTE — PROGRESS NOTES
Problem: Falls - Risk of  Goal: *Absence of Falls  Description: Document Kerwin Barros Fall Risk and appropriate interventions in the flowsheet. Outcome: Progressing Towards Goal  Note: Fall Risk Interventions:  Mobility Interventions: Patient to call before getting OOB    Mentation Interventions: Adequate sleep, hydration, pain control, Evaluate medications/consider consulting pharmacy, Toileting rounds         Elimination Interventions: Call light in reach, Patient to call for help with toileting needs, Urinal in reach              Problem: Patient Education: Go to Patient Education Activity  Goal: Patient/Family Education  Outcome: Progressing Towards Goal     Problem: Airway Clearance - Ineffective  Goal: Achieve or maintain patent airway  Outcome: Progressing Towards Goal     Problem: Gas Exchange - Impaired  Goal: Absence of hypoxia  Outcome: Progressing Towards Goal  Goal: Promote optimal lung function  Outcome: Progressing Towards Goal     Problem: Breathing Pattern - Ineffective  Goal: Ability to achieve and maintain a regular respiratory rate  Outcome: Progressing Towards Goal     Problem:  Body Temperature -  Risk of, Imbalanced  Goal: Ability to maintain a body temperature within defined limits  Outcome: Progressing Towards Goal  Goal: Will regain or maintain usual level of consciousness  Outcome: Progressing Towards Goal  Goal: Complications related to the disease process, condition or treatment will be avoided or minimized  Outcome: Progressing Towards Goal     Problem: Isolation Precautions - Risk of Spread of Infection  Goal: Prevent transmission of infectious organism to others  Outcome: Progressing Towards Goal     Problem: Nutrition Deficits  Goal: Optimize nutrtional status  Outcome: Progressing Towards Goal     Problem: Risk for Fluid Volume Deficit  Goal: Maintain normal heart rhythm  Outcome: Progressing Towards Goal  Goal: Maintain absence of muscle cramping  Outcome: Progressing Towards Goal  Goal: Maintain normal serum potassium, sodium, calcium, phosphorus, and pH  Outcome: Progressing Towards Goal     Problem: Loneliness or Risk for Loneliness  Goal: Demonstrate positive use of time alone when socialization is not possible  Outcome: Progressing Towards Goal     Problem: Fatigue  Goal: Verbalize increase energy and improved vitality  Outcome: Progressing Towards Goal     Problem: Pneumonia: Day 1  Goal: Off Pathway (Use only if patient is Off Pathway)  Outcome: Progressing Towards Goal  Goal: Activity/Safety  Outcome: Progressing Towards Goal  Goal: Consults, if ordered  Outcome: Progressing Towards Goal  Goal: Diagnostic Test/Procedures  Outcome: Progressing Towards Goal  Goal: Nutrition/Diet  Outcome: Progressing Towards Goal  Goal: Medications  Outcome: Progressing Towards Goal  Goal: Respiratory  Outcome: Progressing Towards Goal  Goal: Treatments/Interventions/Procedures  Outcome: Progressing Towards Goal  Goal: Psychosocial  Outcome: Progressing Towards Goal  Goal: *Oxygen saturation within defined limits  Outcome: Progressing Towards Goal  Goal: *Influenza vaccine administered (October-March)  Outcome: Progressing Towards Goal  Goal: *Pneumoccocal vaccine administered  Outcome: Progressing Towards Goal  Goal: *Hemodynamically stable  Outcome: Progressing Towards Goal  Goal: *Demonstrates progressive activity  Outcome: Progressing Towards Goal  Goal: *Tolerating diet  Outcome: Progressing Towards Goal

## 2021-08-29 LAB
ANION GAP SERPL CALC-SCNC: 6 MMOL/L (ref 7–16)
BASOPHILS # BLD: 0 K/UL (ref 0–0.2)
BASOPHILS NFR BLD: 0 % (ref 0–2)
BUN SERPL-MCNC: 19 MG/DL (ref 8–23)
CALCIUM SERPL-MCNC: 8.8 MG/DL (ref 8.3–10.4)
CHLORIDE SERPL-SCNC: 104 MMOL/L (ref 98–107)
CO2 SERPL-SCNC: 27 MMOL/L (ref 21–32)
CREAT SERPL-MCNC: 0.76 MG/DL (ref 0.8–1.5)
DIFFERENTIAL METHOD BLD: ABNORMAL
EOSINOPHIL # BLD: 0 K/UL (ref 0–0.8)
EOSINOPHIL NFR BLD: 0 % (ref 0.5–7.8)
ERYTHROCYTE [DISTWIDTH] IN BLOOD BY AUTOMATED COUNT: 12.8 % (ref 11.9–14.6)
GLUCOSE BLD STRIP.AUTO-MCNC: 224 MG/DL (ref 65–100)
GLUCOSE BLD STRIP.AUTO-MCNC: 281 MG/DL (ref 65–100)
GLUCOSE BLD STRIP.AUTO-MCNC: 287 MG/DL (ref 65–100)
GLUCOSE BLD STRIP.AUTO-MCNC: 316 MG/DL (ref 65–100)
GLUCOSE SERPL-MCNC: 227 MG/DL (ref 65–100)
HCT VFR BLD AUTO: 43.5 % (ref 41.1–50.3)
HGB BLD-MCNC: 15.2 G/DL (ref 13.6–17.2)
IMM GRANULOCYTES # BLD AUTO: 0.1 K/UL (ref 0–0.5)
IMM GRANULOCYTES NFR BLD AUTO: 1 % (ref 0–5)
LYMPHOCYTES # BLD: 0.4 K/UL (ref 0.5–4.6)
LYMPHOCYTES NFR BLD: 3 % (ref 13–44)
MCH RBC QN AUTO: 29.5 PG (ref 26.1–32.9)
MCHC RBC AUTO-ENTMCNC: 34.9 G/DL (ref 31.4–35)
MCV RBC AUTO: 84.3 FL (ref 79.6–97.8)
MONOCYTES # BLD: 0.3 K/UL (ref 0.1–1.3)
MONOCYTES NFR BLD: 2 % (ref 4–12)
NEUTS SEG # BLD: 12.2 K/UL (ref 1.7–8.2)
NEUTS SEG NFR BLD: 94 % (ref 43–78)
NRBC # BLD: 0 K/UL (ref 0–0.2)
PLATELET # BLD AUTO: 67 K/UL (ref 150–450)
PLATELET COMMENTS,PCOM: ABNORMAL
PMV BLD AUTO: 11.1 FL (ref 9.4–12.3)
POTASSIUM SERPL-SCNC: 3.6 MMOL/L (ref 3.5–5.1)
RBC # BLD AUTO: 5.16 M/UL (ref 4.23–5.6)
RBC MORPH BLD: ABNORMAL
SERVICE CMNT-IMP: ABNORMAL
SODIUM SERPL-SCNC: 137 MMOL/L (ref 138–145)
WBC # BLD AUTO: 13 K/UL (ref 4.3–11.1)
WBC MORPH BLD: ABNORMAL

## 2021-08-29 PROCEDURE — 82962 GLUCOSE BLOOD TEST: CPT

## 2021-08-29 PROCEDURE — 74011250636 HC RX REV CODE- 250/636: Performed by: NURSE PRACTITIONER

## 2021-08-29 PROCEDURE — 80048 BASIC METABOLIC PNL TOTAL CA: CPT

## 2021-08-29 PROCEDURE — 74011636637 HC RX REV CODE- 636/637: Performed by: NURSE PRACTITIONER

## 2021-08-29 PROCEDURE — 85025 COMPLETE CBC W/AUTO DIFF WBC: CPT

## 2021-08-29 PROCEDURE — 74011250636 HC RX REV CODE- 250/636: Performed by: HOSPITALIST

## 2021-08-29 PROCEDURE — 77010033678 HC OXYGEN DAILY

## 2021-08-29 PROCEDURE — 65270000029 HC RM PRIVATE

## 2021-08-29 PROCEDURE — 94760 N-INVAS EAR/PLS OXIMETRY 1: CPT

## 2021-08-29 PROCEDURE — 36415 COLL VENOUS BLD VENIPUNCTURE: CPT

## 2021-08-29 PROCEDURE — 74011000258 HC RX REV CODE- 258: Performed by: HOSPITALIST

## 2021-08-29 RX ORDER — INSULIN GLARGINE 100 [IU]/ML
20 INJECTION, SOLUTION SUBCUTANEOUS
Status: DISCONTINUED | OUTPATIENT
Start: 2021-08-29 | End: 2021-08-30

## 2021-08-29 RX ADMIN — INSULIN GLARGINE 20 UNITS: 100 INJECTION, SOLUTION SUBCUTANEOUS at 21:20

## 2021-08-29 RX ADMIN — Medication 10 ML: at 21:19

## 2021-08-29 RX ADMIN — INSULIN LISPRO 6 UNITS: 100 INJECTION, SOLUTION INTRAVENOUS; SUBCUTANEOUS at 17:20

## 2021-08-29 RX ADMIN — DEXAMETHASONE 6 MG: 4 TABLET ORAL at 08:05

## 2021-08-29 RX ADMIN — INSULIN LISPRO 4 UNITS: 100 INJECTION, SOLUTION INTRAVENOUS; SUBCUTANEOUS at 12:19

## 2021-08-29 RX ADMIN — Medication 10 ML: at 13:49

## 2021-08-29 RX ADMIN — INSULIN LISPRO 8 UNITS: 100 INJECTION, SOLUTION INTRAVENOUS; SUBCUTANEOUS at 21:19

## 2021-08-29 RX ADMIN — DOXYCYCLINE 100 MG: 100 INJECTION, POWDER, LYOPHILIZED, FOR SOLUTION INTRAVENOUS at 00:37

## 2021-08-29 RX ADMIN — Medication 10 ML: at 05:14

## 2021-08-29 RX ADMIN — CEFTRIAXONE 2 G: 2 INJECTION, POWDER, FOR SOLUTION INTRAMUSCULAR; INTRAVENOUS at 22:42

## 2021-08-29 RX ADMIN — DOXYCYCLINE 100 MG: 100 INJECTION, POWDER, LYOPHILIZED, FOR SOLUTION INTRAVENOUS at 13:50

## 2021-08-29 RX ADMIN — INSULIN LISPRO 4 UNITS: 100 INJECTION, SOLUTION INTRAVENOUS; SUBCUTANEOUS at 08:06

## 2021-08-29 NOTE — PROGRESS NOTES
Problem: Falls - Risk of  Goal: *Absence of Falls  Description: Document Yaneth Cates Fall Risk and appropriate interventions in the flowsheet. Outcome: Progressing Towards Goal  Note: Fall Risk Interventions:  Mobility Interventions: Patient to call before getting OOB, Strengthening exercises (ROM-active/passive)    Mentation Interventions: Adequate sleep, hydration, pain control, More frequent rounding, Toileting rounds         Elimination Interventions: Call light in reach, Patient to call for help with toileting needs, Urinal in reach              Problem: Patient Education: Go to Patient Education Activity  Goal: Patient/Family Education  Outcome: Progressing Towards Goal     Problem: Airway Clearance - Ineffective  Goal: Achieve or maintain patent airway  Outcome: Progressing Towards Goal     Problem: Gas Exchange - Impaired  Goal: Absence of hypoxia  Outcome: Progressing Towards Goal  Goal: Promote optimal lung function  Outcome: Progressing Towards Goal     Problem: Breathing Pattern - Ineffective  Goal: Ability to achieve and maintain a regular respiratory rate  Outcome: Progressing Towards Goal     Problem:  Body Temperature -  Risk of, Imbalanced  Goal: Ability to maintain a body temperature within defined limits  Outcome: Progressing Towards Goal  Goal: Will regain or maintain usual level of consciousness  Outcome: Progressing Towards Goal  Goal: Complications related to the disease process, condition or treatment will be avoided or minimized  Outcome: Progressing Towards Goal     Problem: Isolation Precautions - Risk of Spread of Infection  Goal: Prevent transmission of infectious organism to others  Outcome: Progressing Towards Goal     Problem: Nutrition Deficits  Goal: Optimize nutrtional status  Outcome: Progressing Towards Goal     Problem: Risk for Fluid Volume Deficit  Goal: Maintain normal heart rhythm  Outcome: Progressing Towards Goal  Goal: Maintain absence of muscle cramping  Outcome: Progressing Towards Goal  Goal: Maintain normal serum potassium, sodium, calcium, phosphorus, and pH  Outcome: Progressing Towards Goal     Problem: Loneliness or Risk for Loneliness  Goal: Demonstrate positive use of time alone when socialization is not possible  Outcome: Progressing Towards Goal     Problem: Fatigue  Goal: Verbalize increase energy and improved vitality  Outcome: Progressing Towards Goal     Problem: Patient Education: Go to Patient Education Activity  Goal: Patient/Family Education  Outcome: Progressing Towards Goal     Problem: Patient Education: Go to Patient Education Activity  Goal: Patient/Family Education  Outcome: Progressing Towards Goal

## 2021-08-29 NOTE — PROGRESS NOTES
[facility] Hospitalist Progress Note     Name:  Loyda Bryan  Age:70 y.o. Sex:male   :  1950    MRN:  [id number]     Admit Date:  2021    Reason for Admission:  Pneumonia due to COVID-19 virus [U07.1, J12.82]  Hypertension [I10]    Hospital Course/Interval history:     69year old CM PMH DM, HTN has no PCP admitted  for acute respiratory failure with hypoxia. He was dx with COVID 19  at outpatient. Per EMS, he was noted with saturations mid 80s, does not require oxygen at baseline. He has had 1 vaccine with 2nd due next week. Subjective (21): Alert and oriented x3. Oxygen 6 liters, saturations mid 90s. Afebrile. Platelets 67. Assessment & Plan     1. Acute respiratory failure with hypoxia related to COVID 19 infection:  Dx , requiring oxygen which is new for him  With de-escalate ABT if above negative  Wean oxygen as able  Dexamethasone 6 mg oral daily   procal 2.01, ABT will continue. leigionella and mycoplasma, strep pending. Day 2 of Remdesivir  Encourage IS use, deep breathing    2. DM2:  A1C on  from doctor office noted 12  SSI   Adding Lantus insulin 15 units nightly. *Patient on Dexamethasone so expect increase in BS   increasing Lantus to 20 units nightly, diabetic educator consult placed. 3. Essential HTN:  Well controlled, not on any medications. Trend    4. Thrombocytopenia:  Noted 26  On  noted 98  Likely related to [place] 19 infetion  Patient states having a H/O same over 10 years ago  Trend for now, transfuse if less than 10   now 78, pathologist review smear pending        Diet:  DIET ADULT ORAL NUTRITION SUPPLEMENT  DIET ADULT  DVT PPx: SCD given thrombocytopenia  Code status: Full Code  Disposition/Expected LOS: 1-2 days          Review of Systems: 14 point review of systems is otherwise negative with the exception of the elements mentioned above.     Objective:     Patient Vitals for the past 24 hrs:   Temp Pulse Resp BP SpO2 08/29/21 1109 98.8 °F (37.1 °C) 92 28 121/69 94 %   08/29/21 0714 97.5 °F (36.4 °C) 85 24 122/73 90 %   08/29/21 0445     93 %   08/29/21 0424 97.3 °F (36.3 °C) 80 20 118/73 (!) 85 %   08/28/21 2315 97.7 °F (36.5 °C) 77 21 112/65 91 %   08/28/21 2007 97.4 °F (36.3 °C) 79 21 126/78 94 %   08/28/21 1623 (!) 96.5 °F (35.8 °C) 78 24 127/77 94 %     Oxygen Therapy  O2 Sat (%): 94 % (08/29/21 1109)  Pulse via Oximetry: 91 beats per minute (08/27/21 2331)  O2 Device: Hi flow nasal cannula (08/29/21 0716)  Skin Assessment: Clean, dry, & intact (08/29/21 0445)  O2 Flow Rate (L/min): 6 l/min (08/29/21 0716)    Body mass index is 33.89 kg/m².     Physical Exam:   General:  No acute distress, speaking in full sentences, no use of accessory muscles   Lungs:  Diminished lower lobes bilat  CV:  Regular rate and rhythm with normal S1 and S2   Abdomen:  Soft, nontender, nondistended, normoactive bowel sounds   Extremities:  No cyanosis clubbing or edema   Neuro:  Nonfocal, A&O x3   Psych:  Normal affect     Data Review:  I have reviewed all labs, meds, and studies from the last 24 hours:    Labs:    Recent Results (from the past 24 hour(s))   GLUCOSE, POC    Collection Time: 08/28/21  4:19 PM   Result Value Ref Range    Glucose (POC) 361 (H) 65 - 100 mg/dL    Performed by Lucía    GLUCOSE, POC    Collection Time: 08/28/21  8:05 PM   Result Value Ref Range    Glucose (POC) 330 (H) 65 - 100 mg/dL    Performed by [facility]    METABOLIC PANEL, BASIC    Collection Time: 08/29/21  5:24 AM   Result Value Ref Range    Sodium 137 (L) 138 - 145 mmol/L    Potassium 3.6 3.5 - 5.1 mmol/L    Chloride 104 98 - 107 mmol/L    CO2 27 21 - 32 mmol/L    Anion gap 6 (L) 7 - 16 mmol/L    Glucose 227 (H) 65 - 100 mg/dL    BUN 19 8 - 23 MG/DL    Creatinine 0.76 (L) 0.8 - 1.5 MG/DL    GFR est AA >60 >60 ml/min/1.73m2    GFR est non-AA >60 >60 ml/min/1.73m2    Calcium 8.8 8.3 - 10.4 MG/DL   GLUCOSE, POC    Collection Time: 08/29/21  7:10 AM Result Value Ref Range    Glucose (POC) 224 (H) 65 - 100 mg/dL    Performed by CasagemNATO    CBC WITH AUTOMATED DIFF    Collection Time: 08/29/21 11:21 AM   Result Value Ref Range    WBC 13.0 (H) 4.3 - 11.1 K/uL    RBC 5.16 4.23 - 5.6 M/uL    HGB 15.2 13.6 - 17.2 g/dL    HCT 43.5 41.1 - 50.3 %    MCV 84.3 79.6 - 97.8 FL    MCH 29.5 26.1 - 32.9 PG    MCHC 34.9 31.4 - 35.0 g/dL    RDW 12.8 11.9 - 14.6 %    PLATELET 67 (L) 280 - 450 K/uL    MPV 11.1 9.4 - 12.3 FL    ABSOLUTE NRBC 0.00 0.0 - 0.2 K/uL    DF AUTOMATED      NEUTROPHILS 94 (H) 43 - 78 %    LYMPHOCYTES 3 (L) 13 - 44 %    MONOCYTES 2 (L) 4.0 - 12.0 %    EOSINOPHILS 0 (L) 0.5 - 7.8 %    BASOPHILS 0 0.0 - 2.0 %    IMMATURE GRANULOCYTES 1 0.0 - 5.0 %    ABS. NEUTROPHILS 12.2 (H) 1.7 - 8.2 K/UL    ABS. LYMPHOCYTES 0.4 (L) 0.5 - 4.6 K/UL    ABS. MONOCYTES 0.3 0.1 - 1.3 K/UL    ABS. EOSINOPHILS 0.0 0.0 - 0.8 K/UL    ABS. BASOPHILS 0.0 0.0 - 0.2 K/UL    ABS. IMM. GRANS. 0.1 0.0 - 0.5 K/UL    RBC COMMENTS SLIGHT  ANISOCYTOSIS + POIKILOCYTOSIS        WBC COMMENTS Result Confirmed By Smear      PLATELET COMMENTS DECREASED     GLUCOSE, POC    Collection Time: 08/29/21 12:11 PM   Result Value Ref Range    Glucose (POC) 287 (H) 65 - 100 mg/dL    Performed by CasagemT        All Micro Results     Procedure Component Value Units Date/Time    Biju Jain IGM [[id number]] Collected: 08/28/21 6401    Order Status: Completed Updated: 08/28/21 0644    MYCOPLASMA AB, IGM [[id number]] Collected: 08/27/21 7273    Order Status: Canceled Specimen: Serum     ASHVIN Carrasco, UR/CSF [[id number]]     Order Status: Sent     LEGIONELLA Zoila Fischer URINE [[id number]]     Order Status: Sent Specimen: Urine           EKG Results     Procedure 720 Value Units Date/Time    EKG, 12 LEAD, INITIAL [[id number]] Collected: 08/27/21 2137    Order Status: Completed Updated: 08/28/21 1013     Ventricular Rate 104 BPM      Atrial Rate 104 BPM      P-R Interval 154 ms      QRS Duration 92 ms      Q-T Interval 364 ms      QTC Calculation (Bezet) 478 ms      Calculated P Axis 32 degrees      Calculated R Axis -41 degrees      Calculated T Axis 22 degrees      Diagnosis --     !! AGE AND GENDER SPECIFIC ECG ANALYSIS !! Sinus tachycardia with Premature atrial complexes  Left axis deviation  Pulmonary disease pattern  Abnormal ECG  No previous ECGs available  Confirmed by ST JOSHUA CAROLINA MD (), WILL VAZQUEZ ([id number]) on 8/28/2021 10:13:14 AM            Other Studies:  No results found.     Current Meds:   Current Facility-Administered Medications   Medication Dose Route Frequency    insulin lispro (HUMALOG) injection   SubCUTAneous AC&HS    insulin glargine (LANTUS) injection 15 Units  15 Units SubCUTAneous QHS    dexAMETHasone (DECADRON) tablet 6 mg  6 mg Oral DAILY    sodium chloride (NS) flush 5-40 mL  5-40 mL IntraVENous Q8H    sodium chloride (NS) flush 5-40 mL  5-40 mL IntraVENous PRN    acetaminophen (TYLENOL) tablet 650 mg  650 mg Oral Q6H PRN    Or    acetaminophen (TYLENOL) suppository 650 mg  650 mg Rectal Q6H PRN    polyethylene glycol (MIRALAX) packet 17 g  17 g Oral DAILY PRN    ondansetron (ZOFRAN ODT) tablet 4 mg  4 mg Oral Q8H PRN    Or    ondansetron (ZOFRAN) injection 4 mg  4 mg IntraVENous Q6H PRN    cefTRIAXone (ROCEPHIN) 2 g in 0.9% sodium chloride (MBP/ADV) 50 mL MBP  2 g IntraVENous Q24H    doxycycline (VIBRAMYCIN) 100 mg in 0.9% sodium chloride (MBP/ADV) 100 mL MBP  100 mg IntraVENous Q12H    remdesivir 100 mg in 0.9% sodium chloride 250 mL IVPB  100 mg IntraVENous Q24H       Problem List:  Hospital Problems as of 8/29/2021 Never Reviewed        Codes Class Noted - Resolved POA    DM (diabetes mellitus) ([facility] ICD-10-CM: E11.9  ICD-9-CM: 250.00  8/28/2021 - Present Yes        Hypertension ICD-10-CM: I10  ICD-9-CM: 401.9  8/27/2021 - Present Yes        * (Principal) Pneumonia due to COVID-19 virus ICD-10-CM: U07.1, J12.82  ICD-9-CM: 480.8, 079.89  8/27/2021 - Present Yes Thrombocytopenia ([facility] ICD-10-CM: D69.6  ICD-9-CM: 287.5  8/27/2021 - Present Yes        Acute respiratory failure with hypoxia [facility]) ICD-10-CM: J96.01  ICD-9-CM: 518.81  8/27/2021 - Present Yes                 Signed By: Mecca Kidd NP   [employer] [facility]    August 29, 2021  5:15 PM

## 2021-08-29 NOTE — PROGRESS NOTES
Tech in to check am vital signs. O2 sats at 85% on 3L/min o2 via NC. No distress noted. Pt denies SOB. Tubing checked for kinks, none noted. O2 turned up to 6l/min with no improvement. O2 increased to 15l/min. Pt instructed on deep breathing. Sats up to 95% on 15l/min. Attempt to turn o2 down to 5l/min unsuccessful. Pt desats to 87%. Pt questioned about sleep apnea. States he has been told he does stop breathing in his sleep but has never been treated for it. Respiratory notifed. Pt placed on 6l/min High flow o2 via NC with moisture. Denies any sob. No distress noted. Sats maintaining at 93%. Call light within reach. Will continue to monitor.

## 2021-08-30 ENCOUNTER — APPOINTMENT (OUTPATIENT)
Dept: CT IMAGING | Age: 71
DRG: 177 | End: 2021-08-30
Attending: NURSE PRACTITIONER
Payer: MEDICARE

## 2021-08-30 LAB
ALBUMIN SERPL-MCNC: 2 G/DL (ref 3.2–4.6)
ALBUMIN/GLOB SERPL: 0.5 {RATIO} (ref 1.2–3.5)
ALP SERPL-CCNC: 55 U/L (ref 50–136)
ALT SERPL-CCNC: 28 U/L (ref 12–65)
ANION GAP SERPL CALC-SCNC: 8 MMOL/L (ref 7–16)
AST SERPL-CCNC: 33 U/L (ref 15–37)
BASOPHILS # BLD: 0 K/UL (ref 0–0.2)
BASOPHILS NFR BLD: 0 % (ref 0–2)
BILIRUB SERPL-MCNC: 0.3 MG/DL (ref 0.2–1.1)
BNP SERPL-MCNC: 287 PG/ML (ref 5–125)
BUN SERPL-MCNC: 20 MG/DL (ref 8–23)
CALCIUM SERPL-MCNC: 8.6 MG/DL (ref 8.3–10.4)
CHLORIDE SERPL-SCNC: 105 MMOL/L (ref 98–107)
CO2 SERPL-SCNC: 26 MMOL/L (ref 21–32)
CREAT SERPL-MCNC: 0.75 MG/DL (ref 0.8–1.5)
CRP SERPL HS-MCNC: 85.5 MG/L
D DIMER PPP FEU-MCNC: 1.08 UG/ML(FEU)
DIFFERENTIAL METHOD BLD: ABNORMAL
EOSINOPHIL # BLD: 0 K/UL (ref 0–0.8)
EOSINOPHIL NFR BLD: 0 % (ref 0.5–7.8)
ERYTHROCYTE [DISTWIDTH] IN BLOOD BY AUTOMATED COUNT: 13.1 % (ref 11.9–14.6)
FERRITIN SERPL-MCNC: 1029 NG/ML (ref 8–388)
GLOBULIN SER CALC-MCNC: 3.8 G/DL (ref 2.3–3.5)
GLUCOSE BLD STRIP.AUTO-MCNC: 219 MG/DL (ref 65–100)
GLUCOSE BLD STRIP.AUTO-MCNC: 257 MG/DL (ref 65–100)
GLUCOSE BLD STRIP.AUTO-MCNC: 293 MG/DL (ref 65–100)
GLUCOSE BLD STRIP.AUTO-MCNC: 298 MG/DL (ref 65–100)
GLUCOSE BLD STRIP.AUTO-MCNC: 323 MG/DL (ref 65–100)
GLUCOSE SERPL-MCNC: 238 MG/DL (ref 65–100)
HCT VFR BLD AUTO: 42.4 % (ref 41.1–50.3)
HGB BLD-MCNC: 14.3 G/DL (ref 13.6–17.2)
IMM GRANULOCYTES # BLD AUTO: 0.1 K/UL (ref 0–0.5)
IMM GRANULOCYTES NFR BLD AUTO: 1 % (ref 0–5)
LDH SERPL L TO P-CCNC: 364 U/L (ref 110–210)
LYMPHOCYTES # BLD: 0.6 K/UL (ref 0.5–4.6)
LYMPHOCYTES NFR BLD: 7 % (ref 13–44)
MCH RBC QN AUTO: 28.8 PG (ref 26.1–32.9)
MCHC RBC AUTO-ENTMCNC: 33.7 G/DL (ref 31.4–35)
MCV RBC AUTO: 85.3 FL (ref 79.6–97.8)
MONOCYTES # BLD: 0.3 K/UL (ref 0.1–1.3)
MONOCYTES NFR BLD: 3 % (ref 4–12)
NEUTS SEG # BLD: 7.6 K/UL (ref 1.7–8.2)
NEUTS SEG NFR BLD: 89 % (ref 43–78)
NRBC # BLD: 0 K/UL (ref 0–0.2)
PATH REV BLD -IMP: NORMAL
PLATELET # BLD AUTO: 84 K/UL (ref 150–450)
PMV BLD AUTO: 11.2 FL (ref 9.4–12.3)
POTASSIUM SERPL-SCNC: 3.7 MMOL/L (ref 3.5–5.1)
PROCALCITONIN SERPL-MCNC: 0.72 NG/ML
PROT SERPL-MCNC: 5.8 G/DL (ref 6.3–8.2)
RBC # BLD AUTO: 4.97 M/UL (ref 4.23–5.6)
SERVICE CMNT-IMP: ABNORMAL
SODIUM SERPL-SCNC: 139 MMOL/L (ref 138–145)
WBC # BLD AUTO: 8.6 K/UL (ref 4.3–11.1)

## 2021-08-30 PROCEDURE — 74011000258 HC RX REV CODE- 258: Performed by: FAMILY MEDICINE

## 2021-08-30 PROCEDURE — 65270000029 HC RM PRIVATE

## 2021-08-30 PROCEDURE — 71260 CT THORAX DX C+: CPT

## 2021-08-30 PROCEDURE — 86141 C-REACTIVE PROTEIN HS: CPT

## 2021-08-30 PROCEDURE — 74011636637 HC RX REV CODE- 636/637: Performed by: NURSE PRACTITIONER

## 2021-08-30 PROCEDURE — 74011000258 HC RX REV CODE- 258: Performed by: HOSPITALIST

## 2021-08-30 PROCEDURE — 74011250636 HC RX REV CODE- 250/636: Performed by: HOSPITALIST

## 2021-08-30 PROCEDURE — 80053 COMPREHEN METABOLIC PANEL: CPT

## 2021-08-30 PROCEDURE — 82962 GLUCOSE BLOOD TEST: CPT

## 2021-08-30 PROCEDURE — 83615 LACTATE (LD) (LDH) ENZYME: CPT

## 2021-08-30 PROCEDURE — 85025 COMPLETE CBC W/AUTO DIFF WBC: CPT

## 2021-08-30 PROCEDURE — 83880 ASSAY OF NATRIURETIC PEPTIDE: CPT

## 2021-08-30 PROCEDURE — 82728 ASSAY OF FERRITIN: CPT

## 2021-08-30 PROCEDURE — 36415 COLL VENOUS BLD VENIPUNCTURE: CPT

## 2021-08-30 PROCEDURE — 74011250636 HC RX REV CODE- 250/636: Performed by: NURSE PRACTITIONER

## 2021-08-30 PROCEDURE — 77010033678 HC OXYGEN DAILY

## 2021-08-30 PROCEDURE — 74011000250 HC RX REV CODE- 250: Performed by: HOSPITALIST

## 2021-08-30 PROCEDURE — 74011000636 HC RX REV CODE- 636: Performed by: FAMILY MEDICINE

## 2021-08-30 PROCEDURE — 84145 PROCALCITONIN (PCT): CPT

## 2021-08-30 PROCEDURE — 85379 FIBRIN DEGRADATION QUANT: CPT

## 2021-08-30 PROCEDURE — 94760 N-INVAS EAR/PLS OXIMETRY 1: CPT

## 2021-08-30 RX ORDER — SODIUM CHLORIDE 0.9 % (FLUSH) 0.9 %
10 SYRINGE (ML) INJECTION
Status: COMPLETED | OUTPATIENT
Start: 2021-08-30 | End: 2021-08-30

## 2021-08-30 RX ORDER — FUROSEMIDE 10 MG/ML
40 INJECTION INTRAMUSCULAR; INTRAVENOUS ONCE
Status: COMPLETED | OUTPATIENT
Start: 2021-08-30 | End: 2021-08-30

## 2021-08-30 RX ORDER — CHOLECALCIFEROL (VITAMIN D3) 125 MCG
5 CAPSULE ORAL
Status: DISCONTINUED | OUTPATIENT
Start: 2021-08-30 | End: 2021-09-06 | Stop reason: HOSPADM

## 2021-08-30 RX ORDER — INSULIN LISPRO 100 [IU]/ML
3 INJECTION, SOLUTION INTRAVENOUS; SUBCUTANEOUS
Status: DISCONTINUED | OUTPATIENT
Start: 2021-08-30 | End: 2021-08-31

## 2021-08-30 RX ORDER — DEXAMETHASONE 4 MG/1
6 TABLET ORAL EVERY 8 HOURS
Status: DISCONTINUED | OUTPATIENT
Start: 2021-08-30 | End: 2021-09-04

## 2021-08-30 RX ORDER — INSULIN GLARGINE 100 [IU]/ML
30 INJECTION, SOLUTION SUBCUTANEOUS
Status: DISCONTINUED | OUTPATIENT
Start: 2021-08-30 | End: 2021-08-31

## 2021-08-30 RX ADMIN — IOPAMIDOL 75 ML: 755 INJECTION, SOLUTION INTRAVENOUS at 09:25

## 2021-08-30 RX ADMIN — DEXAMETHASONE 6 MG: 4 TABLET ORAL at 14:04

## 2021-08-30 RX ADMIN — INSULIN LISPRO 6 UNITS: 100 INJECTION, SOLUTION INTRAVENOUS; SUBCUTANEOUS at 21:29

## 2021-08-30 RX ADMIN — DEXAMETHASONE 6 MG: 4 TABLET ORAL at 09:04

## 2021-08-30 RX ADMIN — Medication 10 ML: at 21:30

## 2021-08-30 RX ADMIN — REMDESIVIR 100 MG: 100 INJECTION, POWDER, LYOPHILIZED, FOR SOLUTION INTRAVENOUS at 00:38

## 2021-08-30 RX ADMIN — INSULIN LISPRO 3 UNITS: 100 INJECTION, SOLUTION INTRAVENOUS; SUBCUTANEOUS at 09:04

## 2021-08-30 RX ADMIN — SODIUM CHLORIDE 100 ML: 900 INJECTION, SOLUTION INTRAVENOUS at 09:25

## 2021-08-30 RX ADMIN — INSULIN LISPRO 4 UNITS: 100 INJECTION, SOLUTION INTRAVENOUS; SUBCUTANEOUS at 09:05

## 2021-08-30 RX ADMIN — CEFTRIAXONE 2 G: 2 INJECTION, POWDER, FOR SOLUTION INTRAMUSCULAR; INTRAVENOUS at 23:18

## 2021-08-30 RX ADMIN — DEXAMETHASONE 6 MG: 4 TABLET ORAL at 21:27

## 2021-08-30 RX ADMIN — Medication 10 ML: at 09:25

## 2021-08-30 RX ADMIN — INSULIN LISPRO 3 UNITS: 100 INJECTION, SOLUTION INTRAVENOUS; SUBCUTANEOUS at 12:08

## 2021-08-30 RX ADMIN — Medication 10 ML: at 14:05

## 2021-08-30 RX ADMIN — DOXYCYCLINE 100 MG: 100 INJECTION, POWDER, LYOPHILIZED, FOR SOLUTION INTRAVENOUS at 14:04

## 2021-08-30 RX ADMIN — INSULIN LISPRO 6 UNITS: 100 INJECTION, SOLUTION INTRAVENOUS; SUBCUTANEOUS at 12:06

## 2021-08-30 RX ADMIN — INSULIN GLARGINE 30 UNITS: 100 INJECTION, SOLUTION SUBCUTANEOUS at 21:28

## 2021-08-30 RX ADMIN — Medication 10 ML: at 05:25

## 2021-08-30 RX ADMIN — DOXYCYCLINE 100 MG: 100 INJECTION, POWDER, LYOPHILIZED, FOR SOLUTION INTRAVENOUS at 01:42

## 2021-08-30 RX ADMIN — FUROSEMIDE 40 MG: 10 INJECTION, SOLUTION INTRAMUSCULAR; INTRAVENOUS at 12:18

## 2021-08-30 RX ADMIN — INSULIN LISPRO 6 UNITS: 100 INJECTION, SOLUTION INTRAVENOUS; SUBCUTANEOUS at 17:03

## 2021-08-30 RX ADMIN — INSULIN LISPRO 3 UNITS: 100 INJECTION, SOLUTION INTRAVENOUS; SUBCUTANEOUS at 17:02

## 2021-08-30 NOTE — DIABETES MGMT
Patient admitted with pneumonia due to COVID-19 virus. Admitting glucose 239. HbA1c 12 () on 8/24/21. Blood glucose ranged 224-323 yesterday with patient receiving Lantus 20 units, Humalog 22 units, and Decadron 6mg. Blood glucose this morning was 219. Reviewed patient current regimen: Lantus 20 units nightly, Humalog 3 units with meals, Humalog correctional insulin, and Decadron 6mg daily. Patient would likely benefit from an increase in basal insulin as fasting blood glucose is not at goal. Provider updated via FND regarding recommendations and patient glycemic control. Pending glycemic control patient would likely benefit from an increase in prandial insulin with normal sensitivity correctional insulin to help offset hyperglycemia during the day related to caloric intake and steroid therapy. Per chart review patient has diet with 5 carb choice restriction and diabetic supplements ordered. Per chart review patient currently on hi flow nasal cannula 12 l/min. Will continue to follow along.

## 2021-08-30 NOTE — PROGRESS NOTES
Problem: Falls - Risk of  Goal: *Absence of Falls  Description: Document Bakari Suazo Fall Risk and appropriate interventions in the flowsheet. Outcome: Progressing Towards Goal  Note: Fall Risk Interventions:  Mobility Interventions: Patient to call before getting OOB    Mentation Interventions: Adequate sleep, hydration, pain control, Toileting rounds         Elimination Interventions: Call light in reach, Stay With Me (per policy), Patient to call for help with toileting needs    History of Falls Interventions: Investigate reason for fall         Problem: Patient Education: Go to Patient Education Activity  Goal: Patient/Family Education  Outcome: Progressing Towards Goal     Problem: Airway Clearance - Ineffective  Goal: Achieve or maintain patent airway  Outcome: Progressing Towards Goal     Problem: Gas Exchange - Impaired  Goal: Absence of hypoxia  Outcome: Progressing Towards Goal  Goal: Promote optimal lung function  Outcome: Progressing Towards Goal     Problem: Breathing Pattern - Ineffective  Goal: Ability to achieve and maintain a regular respiratory rate  Outcome: Progressing Towards Goal     Problem:  Body Temperature -  Risk of, Imbalanced  Goal: Ability to maintain a body temperature within defined limits  Outcome: Progressing Towards Goal  Goal: Will regain or maintain usual level of consciousness  Outcome: Progressing Towards Goal  Goal: Complications related to the disease process, condition or treatment will be avoided or minimized  Outcome: Progressing Towards Goal     Problem: Isolation Precautions - Risk of Spread of Infection  Goal: Prevent transmission of infectious organism to others  Outcome: Progressing Towards Goal     Problem: Nutrition Deficits  Goal: Optimize nutrtional status  Outcome: Progressing Towards Goal     Problem: Risk for Fluid Volume Deficit  Goal: Maintain normal heart rhythm  Outcome: Progressing Towards Goal  Goal: Maintain absence of muscle cramping  Outcome: Progressing Towards Goal  Goal: Maintain normal serum potassium, sodium, calcium, phosphorus, and pH  Outcome: Progressing Towards Goal     Problem: Loneliness or Risk for Loneliness  Goal: Demonstrate positive use of time alone when socialization is not possible  Outcome: Progressing Towards Goal     Problem: Fatigue  Goal: Verbalize increase energy and improved vitality  Outcome: Progressing Towards Goal     Problem: Patient Education: Go to Patient Education Activity  Goal: Patient/Family Education  Outcome: Progressing Towards Goal     Problem: Patient Education: Go to Patient Education Activity  Goal: Patient/Family Education  Outcome: Progressing Towards Goal     Problem: Pneumonia: Day 1  Goal: Off Pathway (Use only if patient is Off Pathway)  Outcome: Progressing Towards Goal  Goal: Activity/Safety  Outcome: Progressing Towards Goal  Goal: Consults, if ordered  Outcome: Progressing Towards Goal  Goal: Diagnostic Test/Procedures  Outcome: Progressing Towards Goal  Goal: Nutrition/Diet  Outcome: Progressing Towards Goal  Goal: Medications  Outcome: Progressing Towards Goal  Goal: Respiratory  Outcome: Progressing Towards Goal  Goal: Treatments/Interventions/Procedures  Outcome: Progressing Towards Goal  Goal: Psychosocial  Outcome: Progressing Towards Goal  Goal: *Oxygen saturation within defined limits  Outcome: Progressing Towards Goal  Goal: *Influenza vaccine administered (October-March)  Outcome: Progressing Towards Goal  Goal: *Pneumoccocal vaccine administered  Outcome: Progressing Towards Goal  Goal: *Hemodynamically stable  Outcome: Progressing Towards Goal  Goal: *Demonstrates progressive activity  Outcome: Progressing Towards Goal  Goal: *Tolerating diet  Outcome: Progressing Towards Goal     Problem: Pneumonia: Day 2  Goal: Off Pathway (Use only if patient is Off Pathway)  Outcome: Progressing Towards Goal  Goal: Activity/Safety  Outcome: Progressing Towards Goal  Goal: Consults, if ordered  Outcome: Progressing Towards Goal  Goal: Diagnostic Test/Procedures  Outcome: Progressing Towards Goal  Goal: Nutrition/Diet  Outcome: Progressing Towards Goal  Goal: Discharge Planning  Outcome: Progressing Towards Goal  Goal: Medications  Outcome: Progressing Towards Goal  Goal: Respiratory  Outcome: Progressing Towards Goal  Goal: Treatments/Interventions/Procedures  Outcome: Progressing Towards Goal  Goal: Psychosocial  Outcome: Progressing Towards Goal  Goal: *Oxygen saturation within defined limits  Outcome: Progressing Towards Goal  Goal: *Hemodynamically stable  Outcome: Progressing Towards Goal  Goal: *Demonstrates progressive activity  Outcome: Progressing Towards Goal  Goal: *Tolerating diet  Outcome: Progressing Towards Goal  Goal: *Optimal pain control at patient's stated goal  Outcome: Progressing Towards Goal     Problem: Pneumonia: Day 3  Goal: Off Pathway (Use only if patient is Off Pathway)  Outcome: Progressing Towards Goal  Goal: Activity/Safety  Outcome: Progressing Towards Goal  Goal: Consults, if ordered  Outcome: Progressing Towards Goal  Goal: Diagnostic Test/Procedures  Outcome: Progressing Towards Goal  Goal: Nutrition/Diet  Outcome: Progressing Towards Goal  Goal: Discharge Planning  Outcome: Progressing Towards Goal  Goal: Medications  Outcome: Progressing Towards Goal  Goal: Respiratory  Outcome: Progressing Towards Goal  Goal: Treatments/Interventions/Procedures  Outcome: Progressing Towards Goal  Goal: Psychosocial  Outcome: Progressing Towards Goal  Goal: *Oxygen saturation within defined limits  Outcome: Progressing Towards Goal  Goal: *Hemodynamically stable  Outcome: Progressing Towards Goal  Goal: *Demonstrates progressive activity  Outcome: Progressing Towards Goal  Goal: *Tolerating diet  Outcome: Progressing Towards Goal  Goal: *Describes available resources and support systems  Outcome: Progressing Towards Goal  Goal: *Optimal pain control at patient's stated goal  Outcome: Progressing Towards Goal     Problem: Pneumonia: Day 4  Goal: Off Pathway (Use only if patient is Off Pathway)  Outcome: Progressing Towards Goal  Goal: Activity/Safety  Outcome: Progressing Towards Goal  Goal: Nutrition/Diet  Outcome: Progressing Towards Goal  Goal: Discharge Planning  Outcome: Progressing Towards Goal  Goal: Medications  Outcome: Progressing Towards Goal  Goal: Respiratory  Outcome: Progressing Towards Goal  Goal: Treatments/Interventions/Procedures  Outcome: Progressing Towards Goal  Goal: Psychosocial  Outcome: Progressing Towards Goal     Problem: Pneumonia: Discharge Outcomes  Goal: *Demonstrates progressive activity  Outcome: Progressing Towards Goal  Goal: *Describes follow-up/return visits to physicians  Outcome: Progressing Towards Goal  Goal: *Tolerating diet  Outcome: Progressing Towards Goal  Goal: *Verbalizes name, dosage, time, side effects, and number of days to continue medications  Outcome: Progressing Towards Goal  Goal: *Influenza immunization  Outcome: Progressing Towards Goal  Goal: *Pneumococcal immunization  Outcome: Progressing Towards Goal  Goal: *Respiratory status at baseline  Outcome: Progressing Towards Goal  Goal: *Vital signs within defined limits  Outcome: Progressing Towards Goal  Goal: *Describes available resources and support systems  Outcome: Progressing Towards Goal  Goal: *Optimal pain control at patient's stated goal  Outcome: Progressing Towards Goal

## 2021-08-30 NOTE — PROGRESS NOTES
CM spoke with patient by phone, due to [facility] isolation. Demographics verified. Patient lives with his spouse on the main floor of a 2 level home; 2 steps to enter. Patient states he is independent with ADLs and ambulates without use of an assistive device. No DME in the home. His wife provides transportation, as no longer drives. PCP is Dr. Tanja Carrasco. Patient denies difficulties obtaining medications; he uses [address] [employer] in [place]. No CM needs identified at this time. CM will follow to assist with discharge needs that may arise. Care Management Interventions  PCP Verified by CM:  Yes  Mode of Transport at Discharge: Self  Discharge Durable Medical Equipment: No  Physical Therapy Consult: Yes  Occupational Therapy Consult: Yes  Current Support Network: Own Home, Lives with Spouse  Confirm Follow Up Transport: Family  Discharge Location  Discharge Placement: Home

## 2021-08-30 NOTE — PROGRESS NOTES
Physician Progress Note      Alejandro Gong  CSN #:                  [id number]  :                       1950  ADMIT DATE:       2021 9:30 PM  [address] [place] [place] DATE:  RESPONDING  PROVIDER #:        VENKATESH LUCERO NP          QUERY TEXT:    Pt admitted with COVID-19 and noted to have tachycardia, tachypnea, lactate,  procal and CRP elevated,. If possible, please document in progress notes and discharge summary if you are evaluating and/or treating: The medical record reflects the following:  Risk Factors: PNE d/t COVID, Thrombocytopenia, Acute Respiratory Failure with hypoxia, HX DM2, HTN  Clinical Indicators: Lactate 2.3---1.9, Procal 2.01---0.72, CRP 85.5  WBC 7.2---13.0---8.6 HR max 109. RR max 32 /55   ED Note: cough, congestion, fevers, N/V/D, diagnosed with Covid 4 days ago at [facility], worsening SOB, weakness. EMS Sats 85% on arrival   CT Chest Bilateral Covid pneumonia  Treatment: Monitoring, O2 3-4L, IV Rocephin, IV /Doxycycline, IV Remdesivir,    Thank you,  Markus Rich RN,C BSN  Clinical   [email or web address]  Options provided:  -- Sepsis present on admission due to COVID-19 pneumonia  -- Sepsis not present on admission due to COVID-19 pneumonia  -- Covid-19 pneumonia without sepsis  -- Other - I will add my own diagnosis  -- Disagree - Not applicable / Not valid  -- Disagree - Clinically unable to determine / Unknown  -- Refer to Clinical Documentation Reviewer    PROVIDER RESPONSE TEXT:    This patient has Covid-19 pneumonia without sepsis. Query created by:  Wander Jordan on 2021 11:15 AM      Electronically signed by:  Lorenzo LUCERO NP 2021 11:28 AM

## 2021-08-30 NOTE — PROGRESS NOTES
[facility] Hospitalist Progress Note     Name:  Cecilio Kaminski  Age:70 y.o. Sex:male   :  1950    MRN:  [id number]     Admit Date:  2021    Reason for Admission:  Pneumonia due to COVID-19 virus [U07.1, J12.82]  Hypertension [I10]    Hospital Course/Interval history:     69year old CM PMH DM, HTN has no PCP admitted  for acute respiratory failure with hypoxia. He was dx with COVID 19  at outpatient. Per EMS, he was noted with saturations mid 80s, does not require oxygen at baseline. He has had 1 vaccine with 2nd due next week. Subjective (21): Alert and oriented x3. Oxygen 12 liters, saturations mid 90s. Afebrile. Platelets 84. CT chest COVID PNA and right small pleural effusion. Assessment & Plan     1. Acute respiratory failure with hypoxia related to COVID 19 infection:  Dx , requiring oxygen which is new for him   procal 2.01, ABT will continue. leigionella and mycoplasma, strep pending. Day 2 of Remdesivir  Encourage IS use, deep breathing   requiring higher oxygen use, now on 12 liters with sats mid 90s. CT chest COVID PNA, right small pleural effusion. Increasing Dexamethasone to 6 mg Q8h  Lasix x 1 IV  Check BNP  Day 3 of Remdesivir  Day 4 Rocephin, Day 3 Doxycycline  COVID labs improving to include Procal reduction. 2. DM2:  A1C on  from doctor office noted 12  SSI   Adding Lantus insulin 15 units nightly. *Patient on Dexamethasone so expect increase in BS   increasing Lantus to 20 units nightly, diabetic educator consult placed.  remains elevated with steroid use, adding 3 units premeal and increasing Lantus to 30 units nightly    3. Essential HTN:  Well controlled, not on any medications. Trend    4.  Thrombocytopenia:  Noted 26  On  noted 98  Likely related to Kenneth Cinnamon 19 infection  Patient states having a H/O same over 10 years ago  Trend for now, transfuse if less than 10   now 79, pathologist review smear pending   slowly improving now 80, pathologist review still pending    5. Small right pleural effusion:  Noted on CT chest 8/30  Check BNP  Give Lasix 40 mg IV x 1  I&O        Diet:  DIET ADULT ORAL NUTRITION SUPPLEMENT  DIET ADULT  DVT PPx: SCD given thrombocytopenia  Code status: Full Code  Disposition/Expected LOS: 1-2 days          Review of Systems: 14 point review of systems is otherwise negative with the exception of the elements mentioned above. Objective:     Patient Vitals for the past 24 hrs:   Temp Pulse Resp BP SpO2   08/30/21 0826 97.5 °F (36.4 °C) 69 18 113/70 91 %   08/30/21 0745     91 %   08/30/21 0423 97.6 °F (36.4 °C) 71 17 126/79 90 %   08/29/21 2344 97.5 °F (36.4 °C) 65 18 (!) 105/55 92 %   08/29/21 2203     92 %   08/29/21 1915 98.3 °F (36.8 °C) 79  (!) 142/85 95 %   08/29/21 1626 98.3 °F (36.8 °C) 85 24 118/71 93 %     Oxygen Therapy  O2 Sat (%): 91 % (08/30/21 0826)  Pulse via Oximetry: 78 beats per minute (08/30/21 0745)  O2 Device: Hi flow nasal cannula (08/30/21 0826)  Skin Assessment: Clean, dry, & intact (08/29/21 0445)  O2 Flow Rate (L/min): 12 l/min (08/30/21 0826)    Body mass index is 35.3 kg/m².     Physical Exam:   General:  No acute distress, speaking in full sentences, no use of accessory muscles   Lungs:  Diminished lower lobes bilat  CV:  Regular rate and rhythm with normal S1 and S2   Abdomen:  Soft, nontender, nondistended, normoactive bowel sounds   Extremities:  No cyanosis clubbing or edema   Neuro:  Nonfocal, A&O x3   Psych:  Normal affect     Data Review:  I have reviewed all labs, meds, and studies from the last 24 hours:    Labs:    Recent Results (from the past 24 hour(s))   GLUCOSE, POC    Collection Time: 08/29/21 12:11 PM   Result Value Ref Range    Glucose (POC) 287 (H) 65 - 100 mg/dL    Performed by Lucía    GLUCOSE, POC    Collection Time: 08/29/21  4:16 PM   Result Value Ref Range    Glucose (POC) 281 (H) 65 - 100 mg/dL    Performed by Lucía    GLUCOSE, POC    Collection Time: 08/29/21  8:19 PM   Result Value Ref Range    Glucose (POC) 316 (H) 65 - 100 mg/dL    Performed by [place]    GLUCOSE, POC    Collection Time: 08/30/21 12:00 AM   Result Value Ref Range    Glucose (POC) 323 (H) 65 - 100 mg/dL    Performed by [place]    CBC WITH AUTOMATED DIFF    Collection Time: 08/30/21  5:29 AM   Result Value Ref Range    WBC 8.6 4.3 - 11.1 K/uL    RBC 4.97 4.23 - 5.6 M/uL    HGB 14.3 13.6 - 17.2 g/dL    HCT 42.4 41.1 - 50.3 %    MCV 85.3 79.6 - 97.8 FL    MCH 28.8 26.1 - 32.9 PG    MCHC 33.7 31.4 - 35.0 g/dL    RDW 13.1 11.9 - 14.6 %    PLATELET 84 (L) 316 - 450 K/uL    MPV 11.2 9.4 - 12.3 FL    ABSOLUTE NRBC 0.00 0.0 - 0.2 K/uL    NEUTROPHILS 89 (H) 43 - 78 %    LYMPHOCYTES 7 (L) 13 - 44 %    MONOCYTES 3 (L) 4.0 - 12.0 %    EOSINOPHILS 0 (L) 0.5 - 7.8 %    BASOPHILS 0 0.0 - 2.0 %    IMMATURE GRANULOCYTES 1 0.0 - 5.0 %    ABS. NEUTROPHILS 7.6 1.7 - 8.2 K/UL    ABS. LYMPHOCYTES 0.6 0.5 - 4.6 K/UL    ABS. MONOCYTES 0.3 0.1 - 1.3 K/UL    ABS. EOSINOPHILS 0.0 0.0 - 0.8 K/UL    ABS. BASOPHILS 0.0 0.0 - 0.2 K/UL    ABS. IMM. GRANS. 0.1 0.0 - 0.5 K/UL    DF AUTOMATED     METABOLIC PANEL, COMPREHENSIVE    Collection Time: 08/30/21  5:29 AM   Result Value Ref Range    Sodium 139 138 - 145 mmol/L    Potassium 3.7 3.5 - 5.1 mmol/L    Chloride 105 98 - 107 mmol/L    CO2 26 21 - 32 mmol/L    Anion gap 8 7 - 16 mmol/L    Glucose 238 (H) 65 - 100 mg/dL    BUN 20 8 - 23 MG/DL    Creatinine 0.75 (L) 0.8 - 1.5 MG/DL    GFR est AA >60 >60 ml/min/1.73m2    GFR est non-AA >60 >60 ml/min/1.73m2    Calcium 8.6 8.3 - 10.4 MG/DL    Bilirubin, total 0.3 0.2 - 1.1 MG/DL    ALT (SGPT) 28 12 - 65 U/L    AST (SGOT) 33 15 - 37 U/L    Alk.  phosphatase 55 50 - 136 U/L    Protein, total 5.8 (L) 6.3 - 8.2 g/dL    Albumin 2.0 (L) 3.2 - 4.6 g/dL    Globulin 3.8 (H) 2.3 - 3.5 g/dL    A-G Ratio 0.5 (L) 1.2 - 3.5     CRP, HIGH SENSITIVITY    Collection Time: 08/30/21  5:29 AM   Result Value Ref Range    CRP, High sensitivity 85.5 mg/L   D DIMER    Collection Time: 08/30/21  5:29 AM   Result Value Ref Range    D DIMER 1.08 (H) <0.56 ug/ml(FEU)   LD    Collection Time: 08/30/21  5:29 AM   Result Value Ref Range     (H) 110 - 210 U/L   PROCALCITONIN    Collection Time: 08/30/21  5:29 AM   Result Value Ref Range    Procalcitonin 0.72 ng/mL   FERRITIN    Collection Time: 08/30/21  5:29 AM   Result Value Ref Range    Ferritin 1,029 (H) 8 - 388 NG/ML   GLUCOSE, POC    Collection Time: 08/30/21  7:22 AM   Result Value Ref Range    Glucose (POC) 219 (H) 65 - 100 mg/dL    Performed by Rod Wood        All Micro Results     Procedure Component Value Units Date/Time    Guerline Menjivar IGM [[id number]] Collected: 08/28/21 1679    Order Status: Completed Updated: 08/28/21 0644    MYCOPLASMA AB, IGM [[id number]] Collected: 08/27/21 2325    Order Status: Canceled Specimen: Serum     S. Aileen Braulio, UR/CSF [[id number]]     Order Status: Sent     LEGIONELLA PNEUMOPHILA Fab Bread URINE [[id number]]     Order Status: Sent Specimen: Urine           EKG Results     Procedure 720 Value Units Date/Time    EKG, 12 LEAD, INITIAL [[id number]] Collected: 08/27/21 2137    Order Status: Completed Updated: 08/28/21 1013     Ventricular Rate 104 BPM      Atrial Rate 104 BPM      P-R Interval 154 ms      QRS Duration 92 ms      Q-T Interval 364 ms      QTC Calculation (Bezet) 478 ms      Calculated P Axis 32 degrees      Calculated R Axis -41 degrees      Calculated T Axis 22 degrees      Diagnosis --     !! AGE AND GENDER SPECIFIC ECG ANALYSIS !!   Sinus tachycardia with Premature atrial complexes  Left axis deviation  Pulmonary disease pattern  Abnormal ECG  No previous ECGs available  Confirmed by ST JOSHUA CAROLINA MD (), WILL VAZQUEZ ([id number]) on 8/28/2021 10:13:14 AM            Other Studies:  CT CHEST PULMONARY EMBOLISM    Result Date: 8/30/2021  CTA OF THE CHEST - PE STUDY HISTORY: Hypoxia, Suspected Covid-19 Patient COMPARISON: None TECHNIQUE: A helical acquisition was performed through the chest utilizing 6.74RY slice thickness during the infusion of 100 cc of Isovue-370. 3-D post-processed images were created on an independent workstation. Multiplanar reformats were obtained. The exam was focused on the pulmonary arteries. Dose reduction techniques used: Automated exposure control, adjustment of the mAs and/or kVp according to patient's size, and iterative reconstruction techniques. FINDINGS: *  PULMONARY VESSELS: No evidence of pulmonary embolism. *  PLEURA / PERICARDIUM: Small right pleural effusion. *  KAYODE / MEDIASTINUM: Small hiatal hernia. *  LUNGS: Bilateral groundglass opacities. *  TRACHEOBRONCHIAL TREE: Within normal limits. *  AORTA: Within normal limits. *  CORONARY ARTERIES: Moderate coronary artery calcification is seen. *  CHEST WALL/AXILLA: Within normal limits. *  VISUALIZED UPPER ABDOMEN: Within normal limits. *  SPINE / BONES: Within normal limits. *  ADDITIONAL COMMENTS: None. No evidence of pulmonary embolism. Bilateral Covid pneumonia. Small hiatal hernia. Small right pleural effusion. Coronary artery disease.  Date of Dictation: 8/30/2021 9:41 AM      Current Meds:   Current Facility-Administered Medications   Medication Dose Route Frequency    insulin lispro (HUMALOG) injection 3 Units  3 Units SubCUTAneous TIDAC    insulin glargine (LANTUS) injection 30 Units  30 Units SubCUTAneous QHS    dexAMETHasone (DECADRON) tablet 6 mg  6 mg Oral Q8H    furosemide (LASIX) injection 40 mg  40 mg IntraVENous ONCE    insulin lispro (HUMALOG) injection   SubCUTAneous AC&HS    sodium chloride (NS) flush 5-40 mL  5-40 mL IntraVENous Q8H    sodium chloride (NS) flush 5-40 mL  5-40 mL IntraVENous PRN    acetaminophen (TYLENOL) tablet 650 mg  650 mg Oral Q6H PRN    Or    acetaminophen (TYLENOL) suppository 650 mg  650 mg Rectal Q6H PRN    polyethylene glycol (MIRALAX) packet 17 g  17 g Oral DAILY PRN    ondansetron (ZOFRAN ODT) tablet 4 mg  4 mg Oral Q8H PRN    Or    ondansetron (ZOFRAN) injection 4 mg  4 mg IntraVENous Q6H PRN    cefTRIAXone (ROCEPHIN) 2 g in 0.9% sodium chloride (MBP/ADV) 50 mL MBP  2 g IntraVENous Q24H    doxycycline (VIBRAMYCIN) 100 mg in 0.9% sodium chloride (MBP/ADV) 100 mL MBP  100 mg IntraVENous Q12H    remdesivir 100 mg in 0.9% sodium chloride 250 mL IVPB  100 mg IntraVENous Q24H       Problem List:  Hospital Problems as of 8/30/2021 Never Reviewed        Codes Class Noted - Resolved POA    DM (diabetes mellitus) ([facility] ICD-10-CM: E11.9  ICD-9-CM: 250.00  8/28/2021 - Present Yes        Hypertension ICD-10-CM: I10  ICD-9-CM: 401.9  8/27/2021 - Present Yes        * (Principal) Pneumonia due to COVID-19 virus ICD-10-CM: U07.1, J12.82  ICD-9-CM: 480.8, 079.89  8/27/2021 - Present Yes        Thrombocytopenia ([facility] ICD-10-CM: D69.6  ICD-9-CM: 287.5  8/27/2021 - Present Yes        Acute respiratory failure with hypoxia [facility]) ICD-10-CM: J96.01  ICD-9-CM: 518.81  8/27/2021 - Present Yes                 Signed By: Nina Davidson NP   [employer] [facility]    August 30, 2021  5:15 PM

## 2021-08-30 NOTE — ACP (ADVANCE CARE PLANNING)
Advance Care Planning     Advance Care Planning Activator (Inpatient)  Conversation Note      Date of ACP Conversation: 08/30/21     Conversation Conducted with:   Patient with Decision Making Capacity    ACP Activator: Jan Perez RN      Health Care Decision Maker:    Current Designated Health Care Decision Maker:     Primary Decision Maker: Cris Kaplan - [phone]    Today we documented Decision Maker(s) consistent with Legal Next of Kin hierarchy. Care Preferences    Ventilation: \"If you were in your present state of health and suddenly became very ill and were unable to breathe on your own, what would your preference be about the use of a ventilator (breathing machine) if it were available to you? \"      If patient would desire the use of a ventilator (breathing machine), answer \"yes\", if not \"no\":no    \"If your health worsens and it becomes clear that your chance of recovery is unlikely, what would your preference be about the use of a ventilator (breathing machine) if it were available to you? \"     Would the patient desire the use of a ventilator (breathing machine)? NO      Resuscitation  \"CPR works best to restart the heart when there is a sudden event, like a heart attack, in someone who is otherwise healthy. Unfortunately, CPR does not typically restart the heart for people who have serious health conditions or who are very sick. \"    \"In the event your heart stopped as a result of an underlying serious health condition, would you want attempts to be made to restart your heart (answer \"yes\" for attempt to resuscitate) or would you prefer a natural death (answer \"no\" for do not attempt to resuscitate)? \" no      Length of ACP Conversation in minutes:  < 15 minutes    Conversation Outcomes:  [x] ACP discussion completed  [] Existing advance directive reviewed with patient; no changes to patient's previously recorded wishes     [] New Advance Directive completed   [] Portable Do Not Resuscitate prepared for Provider review and signature  [] POLST/POST/MOLST/MOST prepared for Provider review and signature      Follow-up plan:    [] Schedule follow-up conversation to continue planning  [] Referred individual to Provider for additional questions/concerns   [] Advised patient/agent/surrogate to review completed ACP document and update if needed with changes in condition, patient preferences or care setting     [x] This note routed to one or more involved healthcare providers

## 2021-08-31 LAB
ALBUMIN SERPL-MCNC: 2.1 G/DL (ref 3.2–4.6)
ALBUMIN/GLOB SERPL: 0.6 {RATIO} (ref 1.2–3.5)
ALP SERPL-CCNC: 57 U/L (ref 50–136)
ALT SERPL-CCNC: 34 U/L (ref 12–65)
ANION GAP SERPL CALC-SCNC: 3 MMOL/L (ref 7–16)
AST SERPL-CCNC: 36 U/L (ref 15–37)
BASOPHILS # BLD: 0 K/UL (ref 0–0.2)
BASOPHILS NFR BLD: 0 % (ref 0–2)
BILIRUB SERPL-MCNC: 0.4 MG/DL (ref 0.2–1.1)
BUN SERPL-MCNC: 20 MG/DL (ref 8–23)
CALCIUM SERPL-MCNC: 8.5 MG/DL (ref 8.3–10.4)
CHLORIDE SERPL-SCNC: 107 MMOL/L (ref 98–107)
CO2 SERPL-SCNC: 30 MMOL/L (ref 21–32)
CREAT SERPL-MCNC: 0.65 MG/DL (ref 0.8–1.5)
DIFFERENTIAL METHOD BLD: ABNORMAL
EOSINOPHIL # BLD: 0 K/UL (ref 0–0.8)
EOSINOPHIL NFR BLD: 0 % (ref 0.5–7.8)
ERYTHROCYTE [DISTWIDTH] IN BLOOD BY AUTOMATED COUNT: 13 % (ref 11.9–14.6)
GLOBULIN SER CALC-MCNC: 3.6 G/DL (ref 2.3–3.5)
GLUCOSE BLD STRIP.AUTO-MCNC: 186 MG/DL (ref 65–100)
GLUCOSE BLD STRIP.AUTO-MCNC: 225 MG/DL (ref 65–100)
GLUCOSE BLD STRIP.AUTO-MCNC: 293 MG/DL (ref 65–100)
GLUCOSE BLD STRIP.AUTO-MCNC: 305 MG/DL (ref 65–100)
GLUCOSE SERPL-MCNC: 168 MG/DL (ref 65–100)
HCT VFR BLD AUTO: 42.1 % (ref 41.1–50.3)
HGB BLD-MCNC: 14 G/DL (ref 13.6–17.2)
IMM GRANULOCYTES # BLD AUTO: 0.1 K/UL (ref 0–0.5)
IMM GRANULOCYTES NFR BLD AUTO: 1 % (ref 0–5)
LYMPHOCYTES # BLD: 0.5 K/UL (ref 0.5–4.6)
LYMPHOCYTES NFR BLD: 6 % (ref 13–44)
M PNEUMO IGM SER IA-ACNC: <770 U/ML (ref 0–769)
MCH RBC QN AUTO: 28.4 PG (ref 26.1–32.9)
MCHC RBC AUTO-ENTMCNC: 33.3 G/DL (ref 31.4–35)
MCV RBC AUTO: 85.4 FL (ref 79.6–97.8)
MONOCYTES # BLD: 0.2 K/UL (ref 0.1–1.3)
MONOCYTES NFR BLD: 2 % (ref 4–12)
NEUTS SEG # BLD: 7.7 K/UL (ref 1.7–8.2)
NEUTS SEG NFR BLD: 91 % (ref 43–78)
NRBC # BLD: 0 K/UL (ref 0–0.2)
PLATELET # BLD AUTO: 119 K/UL (ref 150–450)
PLATELET COMMENTS,PCOM: ADEQUATE
PMV BLD AUTO: 10.9 FL (ref 9.4–12.3)
POTASSIUM SERPL-SCNC: 3.8 MMOL/L (ref 3.5–5.1)
PROT SERPL-MCNC: 5.7 G/DL (ref 6.3–8.2)
RBC # BLD AUTO: 4.93 M/UL (ref 4.23–5.6)
RBC MORPH BLD: ABNORMAL
SERVICE CMNT-IMP: ABNORMAL
SODIUM SERPL-SCNC: 140 MMOL/L (ref 138–145)
WBC # BLD AUTO: 8.5 K/UL (ref 4.3–11.1)
WBC MORPH BLD: ABNORMAL

## 2021-08-31 PROCEDURE — 36415 COLL VENOUS BLD VENIPUNCTURE: CPT

## 2021-08-31 PROCEDURE — 97162 PT EVAL MOD COMPLEX 30 MIN: CPT

## 2021-08-31 PROCEDURE — 74011000258 HC RX REV CODE- 258: Performed by: HOSPITALIST

## 2021-08-31 PROCEDURE — 97161 PT EVAL LOW COMPLEX 20 MIN: CPT

## 2021-08-31 PROCEDURE — 85025 COMPLETE CBC W/AUTO DIFF WBC: CPT

## 2021-08-31 PROCEDURE — 97530 THERAPEUTIC ACTIVITIES: CPT

## 2021-08-31 PROCEDURE — 74011000250 HC RX REV CODE- 250: Performed by: HOSPITALIST

## 2021-08-31 PROCEDURE — 74011250636 HC RX REV CODE- 250/636: Performed by: NURSE PRACTITIONER

## 2021-08-31 PROCEDURE — 74011250637 HC RX REV CODE- 250/637: Performed by: EMERGENCY MEDICINE

## 2021-08-31 PROCEDURE — 80053 COMPREHEN METABOLIC PANEL: CPT

## 2021-08-31 PROCEDURE — 74011636637 HC RX REV CODE- 636/637: Performed by: NURSE PRACTITIONER

## 2021-08-31 PROCEDURE — 74011250636 HC RX REV CODE- 250/636: Performed by: HOSPITALIST

## 2021-08-31 PROCEDURE — 97112 NEUROMUSCULAR REEDUCATION: CPT

## 2021-08-31 PROCEDURE — 65270000029 HC RM PRIVATE

## 2021-08-31 PROCEDURE — 97165 OT EVAL LOW COMPLEX 30 MIN: CPT

## 2021-08-31 PROCEDURE — 82962 GLUCOSE BLOOD TEST: CPT

## 2021-08-31 PROCEDURE — 74011250637 HC RX REV CODE- 250/637: Performed by: HOSPITALIST

## 2021-08-31 RX ORDER — INSULIN GLARGINE 100 [IU]/ML
35 INJECTION, SOLUTION SUBCUTANEOUS
Status: DISCONTINUED | OUTPATIENT
Start: 2021-08-31 | End: 2021-09-01

## 2021-08-31 RX ORDER — INSULIN LISPRO 100 [IU]/ML
6 INJECTION, SOLUTION INTRAVENOUS; SUBCUTANEOUS
Status: DISCONTINUED | OUTPATIENT
Start: 2021-08-31 | End: 2021-08-31

## 2021-08-31 RX ORDER — INSULIN LISPRO 100 [IU]/ML
10 INJECTION, SOLUTION INTRAVENOUS; SUBCUTANEOUS
Status: DISCONTINUED | OUTPATIENT
Start: 2021-08-31 | End: 2021-09-01

## 2021-08-31 RX ADMIN — Medication 10 ML: at 14:15

## 2021-08-31 RX ADMIN — INSULIN LISPRO 6 UNITS: 100 INJECTION, SOLUTION INTRAVENOUS; SUBCUTANEOUS at 11:49

## 2021-08-31 RX ADMIN — INSULIN GLARGINE 35 UNITS: 100 INJECTION, SOLUTION SUBCUTANEOUS at 21:42

## 2021-08-31 RX ADMIN — Medication 10 ML: at 21:42

## 2021-08-31 RX ADMIN — DOXYCYCLINE 100 MG: 100 INJECTION, POWDER, LYOPHILIZED, FOR SOLUTION INTRAVENOUS at 02:00

## 2021-08-31 RX ADMIN — INSULIN LISPRO 16 UNITS: 100 INJECTION, SOLUTION INTRAVENOUS; SUBCUTANEOUS at 16:45

## 2021-08-31 RX ADMIN — INSULIN LISPRO 4 UNITS: 100 INJECTION, SOLUTION INTRAVENOUS; SUBCUTANEOUS at 21:40

## 2021-08-31 RX ADMIN — CEFTRIAXONE 2 G: 2 INJECTION, POWDER, FOR SOLUTION INTRAMUSCULAR; INTRAVENOUS at 23:37

## 2021-08-31 RX ADMIN — Medication 10 ML: at 05:46

## 2021-08-31 RX ADMIN — INSULIN LISPRO 2 UNITS: 100 INJECTION, SOLUTION INTRAVENOUS; SUBCUTANEOUS at 07:44

## 2021-08-31 RX ADMIN — INSULIN LISPRO 8 UNITS: 100 INJECTION, SOLUTION INTRAVENOUS; SUBCUTANEOUS at 11:49

## 2021-08-31 RX ADMIN — DEXAMETHASONE 6 MG: 4 TABLET ORAL at 14:15

## 2021-08-31 RX ADMIN — INSULIN LISPRO 3 UNITS: 100 INJECTION, SOLUTION INTRAVENOUS; SUBCUTANEOUS at 07:44

## 2021-08-31 RX ADMIN — REMDESIVIR 100 MG: 100 INJECTION, POWDER, LYOPHILIZED, FOR SOLUTION INTRAVENOUS at 01:07

## 2021-08-31 RX ADMIN — DEXAMETHASONE 6 MG: 4 TABLET ORAL at 21:41

## 2021-08-31 RX ADMIN — DEXAMETHASONE 6 MG: 4 TABLET ORAL at 05:45

## 2021-08-31 RX ADMIN — DOXYCYCLINE 100 MG: 100 INJECTION, POWDER, LYOPHILIZED, FOR SOLUTION INTRAVENOUS at 14:15

## 2021-08-31 RX ADMIN — INSULIN LISPRO 10 UNITS: 100 INJECTION, SOLUTION INTRAVENOUS; SUBCUTANEOUS at 16:43

## 2021-08-31 RX ADMIN — ACETAMINOPHEN 650 MG: 325 TABLET ORAL at 22:23

## 2021-08-31 RX ADMIN — Medication 5 MG: at 21:45

## 2021-08-31 NOTE — PROGRESS NOTES
Pt went from 12 to 15 L O2 HF NC throughout the night. O2 sat currently 92%. Dr. Harrison Habermann notified. No new orders received. Will continue to monitor.

## 2021-08-31 NOTE — PROGRESS NOTES
ACUTE PHYSICAL THERAPY GOALS:  (Developed with and agreed upon by patient and/or caregiver. )  LT. Pt will be able to perform bed mobility and transfers with independence in order to be able to safely function within their home within 7 treatment days. 2. Pt will be able to ambulate a minimum of 250 ft with independence and use of least restrictive device in order to return to home and community ambulation within 7 treatment days. 3. Pt will be able to ambulate up/down 2 stairs with supervision in order to access his/her home safely within 7 treatment days. 4. Pt will demonstrate normal standing and sitting balance with independence in order to safely perform functional mobility, ADLS and decrease fall risk within 7 treatment days. PHYSICAL THERAPY ASSESSMENT: Initial Assessment and Daily Note PT Treatment Day # 1      Riaz Gallego is a 69 y.o. male   PRIMARY DIAGNOSIS: Pneumonia due to COVID-19 virus  Pneumonia due to COVID-19 virus [U07.1, J12.82]  Hypertension [I10]       Reason for Referral:    ICD-10: Treatment Diagnosis: Generalized Muscle Weakness (M62.81)  History of falling (Z91.81)  INPATIENT: Payor: [employer] MEDICARE / Plan: [address] [place] [address] HMO / Product Type: Managed Care Medicare /     ASSESSMENT:     REHAB RECOMMENDATIONS:   Recommendation to date pending progress:  Setting:   No further skilled therapy   Equipment:    To Be Determined     PRIOR LEVEL OF FUNCTION:  (Prior to Hospitalization) INITIAL/CURRENT LEVEL OF FUNCTION:  (Most Recently Demonstrated)   Bed Mobility:   Independent  Sit to Stand:   Independent  Transfers:   Independent  Gait/Mobility:   Independent Bed Mobility:   Not tested  Sit to Stand:  [facility] [employer] Assistance  Transfers:   Not tested  Gait/Mobility:   Standby Assistance     ASSESSMENT:  Mr. John Madrid is a 69year old male admitted with pneumonia secondary to [place]. He presents seated up in chair on 15L of O2 via NC.  With ambulation his SpO2 is able to stay above 90% with encouraged pursed lip breathing and activity pacing. He demonstrates safety with ambulation in the room without any LOB or instability noted throughout. Pt O2 sats have been fluctuating throughout his stay as well as his need for assistance. He is currently functioning below his baseline and would benefit from continued therapy. SUBJECTIVE:   Mr. Jesica Shi states, \"Yep, I can keep going. \"    SOCIAL HISTORY/LIVING ENVIRONMENT: Pt lives in a 2 story home with his wife, he resides on the main floor, one significant fall right before admission, no AD use, independent with all ADLs and transfers at baseline     OBJECTIVE:     PAIN: VITAL SIGNS: LINES/DRAINS:   Pre Treatment: Pain Screen  Pain Scale 1: Numeric (0 - 10)  Pain Intensity 1: 0  Post Treatment: 0   none  O2 Device: Hi flow nasal cannula     GROSS EVALUATION:   Within Functional Limits Abnormal/ Functional Abnormal/ Non-Functional (see comments) Not Tested Comments:   AROM [x] [] [] []    PROM [] [] [] []    Strength [x] [] [] []    Balance [x] [] [] []    Posture [x] [] [] []    Sensation [x] [] [] []    Coordination [] [] [] []    Tone [] [] [] []    Edema [] [] [] []    Activity Tolerance [x] [] [] [] On 15L of O2    [] [] [] []      COGNITION/  PERCEPTION: Intact Impaired   (see comments) Comments:   Orientation [x] []    Vision [x] []    Hearing [x] []    Command Following [x] []    Safety Awareness [] [x]     [] []      MOBILITY: I Mod I S SBA CGA Min Mod Max Total  NT x2 Comments:   Bed Mobility    Rolling [] [] [] [] [] [] [] [] [] [x] []    Supine to Sit [] [] [] [] [] [] [] [] [] [x] []    Scooting [] [] [] [] [] [] [] [] [] [x] []    Sit to Supine [] [] [] [] [] [] [] [] [] [x] []    Transfers    Sit to Stand [] [] [] [x] [] [] [] [] [] [] []    Bed to Chair [] [] [] [] [] [] [] [] [] [x] []    Stand to Sit [] [] [] [x] [] [] [] [] [] [] []    I=Independent, Mod I=Modified Independent, S=Supervision, SBA=Standby Assistance, CGA=Contact Guard Assistance,   Min=Minimal Assistance, Mod=Moderate Assistance, Max=Maximal Assistance, Total=Total Assistance, NT=Not Tested  GAIT: I Mod I S SBA CGA Min Mod Max Total  NT x2 Comments:   Level of Assistance [] [] [] [x] [] [] [] [] [] [] []    Distance 150'    DME None    Gait Quality No deficits noted, VC for pursed lip breathing and activity pacing    Weightbearing Status N/A     I=Independent, Mod I=Modified Independent, S=Supervision, SBA=Standby Assistance, CGA=Contact Guard Assistance,   Min=Minimal Assistance, Mod=Moderate Assistance, Max=Maximal Assistance, Total=Total Assistance, NT=Not Tested    [address] [place] [id number] AM-PAC [facility] [place]       How much difficulty does the patient currently have. .. Unable A Lot A Little None   1. Turning over in bed (including adjusting bedclothes, sheets and blankets)? [] 1   [] 2   [] 3   [x] 4   2. Sitting down on and standing up from a chair with arms ( e.g., wheelchair, bedside commode, etc.)   [] 1   [] 2   [] 3   [x] 4   3. Moving from lying on back to sitting on the side of the bed? [] 1   [] 2   [] 3   [x] 4   How much help from another person does the patient currently need. .. Total A Lot A Little None   4. Moving to and from a bed to a chair (including a wheelchair)? [] 1   [] 2   [] 3   [x] 4   5. Need to walk in hospital room? [] 1   [] 2   [] 3   [x] 4   6. Climbing 3-5 steps with a railing? [] 1   [x] 2   [] 3   [] 4   © 2007, Trustees of [address] [place] [id number], under license to [employer]. All rights reserved     Score:  Initial: 22 Most Recent: X (Date: -- )    Interpretation of Tool:  Represents activities that are increasingly more difficult (i.e. Bed mobility, Transfers, Gait).     PLAN:   FREQUENCY/DURATION: PT Plan of Care:  (no continued therapy) for duration of hospital stay or until stated goals are met, whichever comes first.    PROBLEM LIST:   (Skilled intervention is medically necessary to address:)  1. Decreased ADL/Functional Activities  2. Decreased Activity Tolerance   INTERVENTIONS PLANNED:   (Benefits and precautions of physical therapy have been discussed with the patient.)  1. Therapeutic Activity  2. Therapeutic Exercise/HEP  3. Neuromuscular Re-education  4. Gait Training  5. Manual Therapy  6. Education     TREATMENT:     EVALUATION: Moderate Complexity : (Untimed Charge)    TREATMENT:   ($$ Therapeutic Activity: 8-22 mins    )  Therapeutic Activity (8 Minutes): Therapeutic activity included Transfer Training, Ambulation on level ground, Sitting balance  and Standing balance to improve functional Mobility, Strength and Activity tolerance.     TREATMENT GRID:  N/A    AFTER TREATMENT POSITION/PRECAUTIONS:  Chair, Needs within reach and RN notified    INTERDISCIPLINARY COLLABORATION:  RN/PCT, PT/PTA and OT/EVANS    TOTAL TREATMENT DURATION:  PT Patient Time In/Time Out  Time In: 1056  Time Out: [address] [place], [place]

## 2021-08-31 NOTE — DIABETES MGMT
Patient admitted with pneumonia due to COVID-19. Blood glucose ranged 219-298 yesterday with patient receiving Lantus 30 units, Humalog 31 units, and Decadron 18mg. Blood glucose this morning was 186. Reviewed patient current regimen: Lantus 30 units nightly, Humalog 3 units with meals, Humalog correctional insulin, and Decadron 6mg q8h. Patient would likely benefit from an increase in prandial insulin to help offset hyperglycemia during the day related to caloric intake and steroid therapy. Provider updated via [employer] regarding recommendations and patient glycemic control. New orders received to increase prandial insulin to Humalog 6 units with meals. Noted per chart review patient on Hi flow nasal cannula 14 l/min.

## 2021-08-31 NOTE — DIABETES MGMT
Most recent FSBS 293. Patient would likely benefit from an increase in insulin dosing. Provider updated via [employer] regarding patient glycemic control. New orders received to increase Lantus to 35 units nightly and increase prandial to Humalog 10 units with meals.

## 2021-08-31 NOTE — PROGRESS NOTES
I was paged on 8/30/2021 2044 due to inconsistency between patient's stated code desire and documented CODE STATUS. I went and spoke with patient. He reportedly had a conversation earlier in the day where he decided he would like to be DNR but then when he and they provider who he spoke with discussed this with his wife, she strongly supported his staying full code. At this point, he agrees to be full code in deference to his wife's desires and they plan to further discuss later today. Patient remains FULL CODE.

## 2021-08-31 NOTE — PROGRESS NOTES
Hospitalist Progress Note   Admit Date:  2021  9:30 PM   Name:  Rodney Miller   Age:  69 y.o. Sex:  male  :  1950   MRN:  [id number]     Presenting Complaint: Shortness of Breath    Reason(s) for Admission: Pneumonia due to COVID-19 virus [U07.1, J12.82]  Hypertension [I10]     Hospital Course & Interval History:   69year old CM PMH DM, HTN has no PCP admitted  for acute respiratory failure with hypoxia. He was dx with COVID 19  at outpatient. Per EMS, he was noted with saturations mid 80s, does not require oxygen at baseline. He has had 1 vaccine with 2nd due next week. He was started on remdesivir, dexamethasone, rocephin and doxy. Chest CT  with COVID pneumonia and small effusion, received lasix. Subjective (21): On 14 Liters O2.  sats mid 90s. Wanting to go home, explained his O2 requirements were to high to go home. He verbally understood. Denies CP, SOB, n/v/d, abd pain. Assessment & Plan:     1. Acute respiratory failure with hypoxia related to COVID 19 infection:  Dx , requiring oxygen which is new for him   procal 2.01, ABT will continue. leigionella and mycoplasma, strep pending. Day 2 of Remdesivir  Encourage IS use, deep breathing   requiring higher oxygen use, now on 12 liters with sats mid 90s. CT chest COVID PNA, right small pleural effusion. Increasing Dexamethasone to 6 mg Q8h  Lasix x 1 IV  Check BNP  Day 3 of Remdesivir  Day 4 Rocephin, Day 3 Doxycycline  COVID labs improving to include Procal reduction.  Rocephin EOT today. Doxy EOT . Continue steroids. remdesivir EOT . Increase in O2 demands today to 14 L.        2. DM2:  A1C on  from doctor office noted 12  SSI   Adding Lantus insulin 15 units nightly. *Patient on Dexamethasone so expect increase in BS   increasing Lantus to 20 units nightly, diabetic educator consult placed.    remains elevated with steroid use, adding 3 units premeal and increasing Lantus to 30 units nightly. 8/31 increase prandial insulin to humalog 6 units with meals     3. Essential HTN:  Well controlled, not on any medications. Trend     4. Thrombocytopenia:  Noted 26  On 8/24 noted 98  Likely related to [place] 19 infection  Patient states having a H/O same over 10 years ago  Trend for now, transfuse if less than 10  8/29 now 79, pathologist review smear pending  8/30 slowly improving now 84, pathologist review still pending  8/31 pathology smear. Normocytic/normochromic, adequate platelets     5. Small right pleural effusion:  Noted on CT chest 8/30  Check BNP  Give Lasix 40 mg IV x 1  I&O         Diet:  DIET ADULT ORAL NUTRITION SUPPLEMENT  DIET ADULT  DVT PPx: SCD given thrombocytopenia  Code status: Full Code  Disposition/Expected LOS: 1-2 days       Active Hospital Problems    Diagnosis Date Noted    DM (diabetes mellitus) ([facility] 08/28/2021    Hypertension 08/27/2021    Pneumonia due to COVID-19 virus 08/27/2021    [facility] ([facility] 08/27/2021    Acute respiratory failure with hypoxia ([facility] 08/27/2021       Objective:     Patient Vitals for the past 24 hrs:   Temp Pulse Resp BP SpO2   08/31/21 1203 97 °F (36.1 °C) 76 28 114/77 93 %   08/31/21 0710 97.3 °F (36.3 °C) 80 28 121/76 (!) 89 %   08/31/21 0400 97.4 °F (36.3 °C) 74 18 111/79 90 %   08/30/21 2355 98.6 °F (37 °C) 85 19 127/68 90 %   08/30/21 1935 98.1 °F (36.7 °C) 79 20 121/72 95 %   08/30/21 1606 97.5 °F (36.4 °C) 80 18 122/68 97 %     Oxygen Therapy  O2 Sat (%): 93 % (08/31/21 1203)  Pulse via Oximetry: 78 beats per minute (08/30/21 0745)  O2 Device: Hi flow nasal cannula (08/31/21 0427)  Skin Assessment: Clean, dry, & intact (08/31/21 0427)  Skin Protection for O2 Device: No (08/31/21 0427)  O2 Flow Rate (L/min): 14 l/min (08/31/21 0427)    Estimated body mass index is 35.83 kg/m² as calculated from the following:    Height as of this encounter: 5' 6\" (1.676 m).     Weight as of this encounter: 100.7 kg (222 lb 0.1 oz).    Intake/Output Summary (Last 24 hours) at 8/31/2021 1211  Last data filed at 8/31/2021 0427  Gross per 24 hour   Intake 200 ml   Output 400 ml   Net -200 ml         Physical Exam:   General:          No acute distress, speaking in full sentences, no use of accessory muscles   Lungs:             Diminished lower lobes bilat. resp even and nonlabored  CV:                  Regular rate and rhythm with normal S1 and S2. No LE edema. No murmurs rubs gallops. Abdomen:        Soft, nontender, nondistended, normoactive bowel sounds   Extremities:     No cyanosis clubbing or edema   Neuro:             Nonfocal, A&O x3   Psych:             Normal affect     I have reviewed ordered lab tests and independently visualized imaging below:    Last 24hr Labs:  Recent Results (from the past 24 hour(s))   NT-PRO BNP    Collection Time: 08/30/21  2:08 PM   Result Value Ref Range    NT pro- (H) 5 - 125 PG/ML   GLUCOSE, POC    Collection Time: 08/30/21  4:20 PM   Result Value Ref Range    Glucose (POC) 298 (H) 65 - 100 mg/dL    Performed by [facility]dy[facility]    GLUCOSE, POC    Collection Time: 08/30/21  8:52 PM   Result Value Ref Range    Glucose (POC) 257 (H) 65 - 100 mg/dL    Performed by [place]    CBC WITH AUTOMATED DIFF    Collection Time: 08/31/21  5:21 AM   Result Value Ref Range    WBC 8.5 4.3 - 11.1 K/uL    RBC 4.93 4.23 - 5.6 M/uL    HGB 14.0 13.6 - 17.2 g/dL    HCT 42.1 41.1 - 50.3 %    MCV 85.4 79.6 - 97.8 FL    MCH 28.4 26.1 - 32.9 PG    MCHC 33.3 31.4 - 35.0 g/dL    RDW 13.0 11.9 - 14.6 %    PLATELET 576 (L) 906 - 450 K/uL    MPV 10.9 9.4 - 12.3 FL    ABSOLUTE NRBC 0.00 0.0 - 0.2 K/uL    NEUTROPHILS 91 (H) 43 - 78 %    LYMPHOCYTES 6 (L) 13 - 44 %    MONOCYTES 2 (L) 4.0 - 12.0 %    EOSINOPHILS 0 (L) 0.5 - 7.8 %    BASOPHILS 0 0.0 - 2.0 %    IMMATURE GRANULOCYTES 1 0.0 - 5.0 %    ABS. NEUTROPHILS 7.7 1.7 - 8.2 K/UL    ABS. LYMPHOCYTES 0.5 0.5 - 4.6 K/UL    ABS.  MONOCYTES 0.2 0.1 - 1.3 K/UL ABS. EOSINOPHILS 0.0 0.0 - 0.8 K/UL    ABS. BASOPHILS 0.0 0.0 - 0.2 K/UL    ABS. IMM. GRANS. 0.1 0.0 - 0.5 K/UL    RBC COMMENTS NORMOCYTIC/NORMOCHROMIC      WBC COMMENTS Result Confirmed By Smear      PLATELET COMMENTS ADEQUATE      DF AUTOMATED     METABOLIC PANEL, COMPREHENSIVE    Collection Time: 08/31/21  5:21 AM   Result Value Ref Range    Sodium 140 138 - 145 mmol/L    Potassium 3.8 3.5 - 5.1 mmol/L    Chloride 107 98 - 107 mmol/L    CO2 30 21 - 32 mmol/L    Anion gap 3 (L) 7 - 16 mmol/L    Glucose 168 (H) 65 - 100 mg/dL    BUN 20 8 - 23 MG/DL    Creatinine 0.65 (L) 0.8 - 1.5 MG/DL    GFR est AA >60 >60 ml/min/1.73m2    GFR est non-AA >60 >60 ml/min/1.73m2    Calcium 8.5 8.3 - 10.4 MG/DL    Bilirubin, total 0.4 0.2 - 1.1 MG/DL    ALT (SGPT) 34 12 - 65 U/L    AST (SGOT) 36 15 - 37 U/L    Alk. phosphatase 57 50 - 136 U/L    Protein, total 5.7 (L) 6.3 - 8.2 g/dL    Albumin 2.1 (L) 3.2 - 4.6 g/dL    Globulin 3.6 (H) 2.3 - 3.5 g/dL    A-G Ratio 0.6 (L) 1.2 - 3.5     GLUCOSE, POC    Collection Time: 08/31/21  7:07 AM   Result Value Ref Range    Glucose (POC) 186 (H) 65 - 100 mg/dL    Performed by [facility]owl    GLUCOSE, POC    Collection Time: 08/31/21 11:39 AM   Result Value Ref Range    Glucose (POC) 305 (H) 65 - 100 mg/dL    Performed by [facility]owl        All Micro Results     Procedure Component Value Units Date/Time    MYCOPLASMA AB, IGM [[id number]] Collected: 08/28/21 4696    Order Status: Completed Specimen: Serum Updated: 08/31/21 0135     M. pneumoniae Ab, IgM <770 U/mL      Comment: (NOTE)                              Negative            <770  Clinically significant amount of M. pneumoniae antibody  not detected. Low Positive   770 - 12  M. pneumoniae specific IgM presumptively detected. It  is recommended that another sample be collected 1-2  weeks later to assure reactivity.                               Positive            >950  Highly significant amount of M. pneumoniae specific  IgM antibody detected. Performed At: [address] [id number]  Bentley Severs MD IU:[id number]         Mehnaz Petty [[id number]] Collected: 08/27/21 2330    Order Status: Canceled Specimen: Urine     S. Madi Mccabeo, UR/CSF [[id number]] Collected: 08/27/21 2330    Order Status: [place] Vera AB, IGM [[id number]] Collected: 08/27/21 2325    Order Status: Canceled Specimen: Serum           Other Studies:  No results found.     Current Meds:  Current Facility-Administered Medications   Medication Dose Route Frequency    insulin lispro (HUMALOG) injection 6 Units  6 Units SubCUTAneous TIDAC    insulin glargine (LANTUS) injection 30 Units  30 Units SubCUTAneous QHS    dexAMETHasone (DECADRON) tablet 6 mg  6 mg Oral Q8H    melatonin tablet 5 mg  5 mg Oral QHS    insulin lispro (HUMALOG) injection   SubCUTAneous AC&HS    sodium chloride (NS) flush 5-40 mL  5-40 mL IntraVENous Q8H    sodium chloride (NS) flush 5-40 mL  5-40 mL IntraVENous PRN    acetaminophen (TYLENOL) tablet 650 mg  650 mg Oral Q6H PRN    Or    acetaminophen (TYLENOL) suppository 650 mg  650 mg Rectal Q6H PRN    polyethylene glycol (MIRALAX) packet 17 g  17 g Oral DAILY PRN    ondansetron (ZOFRAN ODT) tablet 4 mg  4 mg Oral Q8H PRN    Or    ondansetron (ZOFRAN) injection 4 mg  4 mg IntraVENous Q6H PRN    cefTRIAXone (ROCEPHIN) 2 g in 0.9% sodium chloride (MBP/ADV) 50 mL MBP  2 g IntraVENous Q24H    doxycycline (VIBRAMYCIN) 100 mg in 0.9% sodium chloride (MBP/ADV) 100 mL MBP  100 mg IntraVENous Q12H       Signed:  Yoel Flores NP    Notes, labs, VS, diagnostic testing reviewed  Case discussed with pt, care team, Dr. Teresa Cano      [phone] attempted to call wife at number listed in chart, no answer, busy signal

## 2021-08-31 NOTE — ROUTINE PROCESS
Bedside shift change report given to Jose L Obando RN by Roberto Mcclendon RN. Report included the following information SBAR, Kardex, ED Summary, Procedure Summary, Intake/Output, MAR and Recent Results.

## 2021-08-31 NOTE — ROUTINE PROCESS
Bedside shift change report received from Clementina Pulliam, [address] to Criss Alfaro, [address] [facility]. Report included the following information SBAR, Kardex, Procedure Summary and MAR. Opportunity for questions was given.

## 2021-08-31 NOTE — PROGRESS NOTES
Problem: Falls - Risk of  Goal: *Absence of Falls  Description: Document Bakari Suazo Fall Risk and appropriate interventions in the flowsheet. Outcome: Progressing Towards Goal  Note: Fall Risk Interventions:  Mobility Interventions: Bed/chair exit alarm    Mentation Interventions: Adequate sleep, hydration, pain control, Bed/chair exit alarm, Door open when patient unattended, Evaluate medications/consider consulting pharmacy, Reorient patient, Update white board    Medication Interventions: Bed/chair exit alarm    Elimination Interventions: Bed/chair exit alarm    History of Falls Interventions: Bed/chair exit alarm, Door open when patient unattended, Evaluate medications/consider consulting pharmacy, Room close to nurse's station         Problem: Patient Education: Go to Patient Education Activity  Goal: Patient/Family Education  Outcome: Progressing Towards Goal     Problem: Airway Clearance - Ineffective  Goal: Achieve or maintain patent airway  Outcome: Progressing Towards Goal     Problem: Gas Exchange - Impaired  Goal: Absence of hypoxia  Outcome: Progressing Towards Goal  Goal: Promote optimal lung function  Outcome: Progressing Towards Goal     Problem: Breathing Pattern - Ineffective  Goal: Ability to achieve and maintain a regular respiratory rate  Outcome: Progressing Towards Goal     Problem:  Body Temperature -  Risk of, Imbalanced  Goal: Ability to maintain a body temperature within defined limits  Outcome: Progressing Towards Goal  Goal: Will regain or maintain usual level of consciousness  Outcome: Progressing Towards Goal  Goal: Complications related to the disease process, condition or treatment will be avoided or minimized  Outcome: Progressing Towards Goal     Problem: Isolation Precautions - Risk of Spread of Infection  Goal: Prevent transmission of infectious organism to others  Outcome: Progressing Towards Goal     Problem: Nutrition Deficits  Goal: Optimize nutrtional status  Outcome: Progressing Towards Goal     Problem: Risk for Fluid Volume Deficit  Goal: Maintain normal heart rhythm  Outcome: Progressing Towards Goal  Goal: Maintain absence of muscle cramping  Outcome: Progressing Towards Goal  Goal: Maintain normal serum potassium, sodium, calcium, phosphorus, and pH  Outcome: Progressing Towards Goal     Problem: Loneliness or Risk for Loneliness  Goal: Demonstrate positive use of time alone when socialization is not possible  Outcome: Progressing Towards Goal     Problem: Fatigue  Goal: Verbalize increase energy and improved vitality  Outcome: Progressing Towards Goal     Problem: Patient Education: Go to Patient Education Activity  Goal: Patient/Family Education  Outcome: Progressing Towards Goal     Problem: Patient Education: Go to Patient Education Activity  Goal: Patient/Family Education  Outcome: Progressing Towards Goal     Problem: Pneumonia: Day 1  Goal: Off Pathway (Use only if patient is Off Pathway)  Outcome: Progressing Towards Goal  Goal: Activity/Safety  Outcome: Progressing Towards Goal  Goal: Consults, if ordered  Outcome: Progressing Towards Goal  Goal: Diagnostic Test/Procedures  Outcome: Progressing Towards Goal  Goal: Nutrition/Diet  Outcome: Progressing Towards Goal  Goal: Medications  Outcome: Progressing Towards Goal  Goal: Respiratory  Outcome: Progressing Towards Goal  Goal: Treatments/Interventions/Procedures  Outcome: Progressing Towards Goal  Goal: Psychosocial  Outcome: Progressing Towards Goal  Goal: *Oxygen saturation within defined limits  Outcome: Progressing Towards Goal  Goal: *Influenza vaccine administered (October-March)  Outcome: Progressing Towards Goal  Goal: *Pneumoccocal vaccine administered  Outcome: Progressing Towards Goal  Goal: *Hemodynamically stable  Outcome: Progressing Towards Goal  Goal: *Demonstrates progressive activity  Outcome: Progressing Towards Goal  Goal: *Tolerating diet  Outcome: Progressing Towards Goal     Problem: Pneumonia: Day 2  Goal: Off Pathway (Use only if patient is Off Pathway)  Outcome: Progressing Towards Goal  Goal: Activity/Safety  Outcome: Progressing Towards Goal  Goal: Consults, if ordered  Outcome: Progressing Towards Goal  Goal: Diagnostic Test/Procedures  Outcome: Progressing Towards Goal  Goal: Nutrition/Diet  Outcome: Progressing Towards Goal  Goal: Discharge Planning  Outcome: Progressing Towards Goal  Goal: Medications  Outcome: Progressing Towards Goal  Goal: Respiratory  Outcome: Progressing Towards Goal  Goal: Treatments/Interventions/Procedures  Outcome: Progressing Towards Goal  Goal: Psychosocial  Outcome: Progressing Towards Goal  Goal: *Oxygen saturation within defined limits  Outcome: Progressing Towards Goal  Goal: *Hemodynamically stable  Outcome: Progressing Towards Goal  Goal: *Demonstrates progressive activity  Outcome: Progressing Towards Goal  Goal: *Tolerating diet  Outcome: Progressing Towards Goal  Goal: *Optimal pain control at patient's stated goal  Outcome: Progressing Towards Goal     Problem: Pneumonia: Day 3  Goal: Off Pathway (Use only if patient is Off Pathway)  Outcome: Progressing Towards Goal  Goal: Activity/Safety  Outcome: Progressing Towards Goal  Goal: Consults, if ordered  Outcome: Progressing Towards Goal  Goal: Diagnostic Test/Procedures  Outcome: Progressing Towards Goal  Goal: Nutrition/Diet  Outcome: Progressing Towards Goal  Goal: Discharge Planning  Outcome: Progressing Towards Goal  Goal: Medications  Outcome: Progressing Towards Goal  Goal: Respiratory  Outcome: Progressing Towards Goal  Goal: Treatments/Interventions/Procedures  Outcome: Progressing Towards Goal  Goal: Psychosocial  Outcome: Progressing Towards Goal  Goal: *Oxygen saturation within defined limits  Outcome: Progressing Towards Goal  Goal: *Hemodynamically stable  Outcome: Progressing Towards Goal  Goal: *Demonstrates progressive activity  Outcome: Progressing Towards Goal  Goal: *Tolerating diet  Outcome: Progressing Towards Goal  Goal: *Describes available resources and support systems  Outcome: Progressing Towards Goal  Goal: *Optimal pain control at patient's stated goal  Outcome: Progressing Towards Goal     Problem: Pneumonia: Day 4  Goal: Off Pathway (Use only if patient is Off Pathway)  Outcome: Progressing Towards Goal  Goal: Activity/Safety  Outcome: Progressing Towards Goal  Goal: Nutrition/Diet  Outcome: Progressing Towards Goal  Goal: Discharge Planning  Outcome: Progressing Towards Goal  Goal: Medications  Outcome: Progressing Towards Goal  Goal: Respiratory  Outcome: Progressing Towards Goal  Goal: Treatments/Interventions/Procedures  Outcome: Progressing Towards Goal  Goal: Psychosocial  Outcome: Progressing Towards Goal     Problem: Pneumonia: Discharge Outcomes  Goal: *Demonstrates progressive activity  Outcome: Progressing Towards Goal  Goal: *Describes follow-up/return visits to physicians  Outcome: Progressing Towards Goal  Goal: *Tolerating diet  Outcome: Progressing Towards Goal  Goal: *Verbalizes name, dosage, time, side effects, and number of days to continue medications  Outcome: Progressing Towards Goal  Goal: *Influenza immunization  Outcome: Progressing Towards Goal  Goal: *Pneumococcal immunization  Outcome: Progressing Towards Goal  Goal: *Respiratory status at baseline  Outcome: Progressing Towards Goal  Goal: *Vital signs within defined limits  Outcome: Progressing Towards Goal  Goal: *Describes available resources and support systems  Outcome: Progressing Towards Goal  Goal: *Optimal pain control at patient's stated goal  Outcome: Progressing Towards Goal

## 2021-08-31 NOTE — PROGRESS NOTES
Patient is currently resting well. His oxygen was weaned twice today, but his oxygen saturation was 74% at 1730. O2 titrated to 15 L/[place] NC until his oxygen saturation  was holding at 91%.

## 2021-08-31 NOTE — PROGRESS NOTES
ACUTE OT GOALS:  (Developed with and agreed upon by patient and/or caregiver.)  1) Patient will complete lower body bathing and dressing with supervision and adaptive equipment as needed. 2) Patient will complete toileting with supervision. 3) Patient will complete functional transfers with supervision and adaptive equipment as needed. 4) Patient will tolerate at least 25 minutes of OT activity with as needed rest breaks while maintaining O2 sats >90%. 5) Patient will verbalize at least 3 energy conservation technique to utilize during ADL/IADL. Timeframe: 7 visits     OCCUPATIONAL THERAPY ASSESSMENT: Initial Assessment and Daily Note OT Treatment Day # 1    Brandi Kelsey is a 69 y.o. male   PRIMARY DIAGNOSIS: Pneumonia due to COVID-19 virus  Pneumonia due to COVID-19 virus [U07.1, J12.82]  Hypertension [I10]       Reason for Referral:    ICD-10: Treatment Diagnosis: Generalized Muscle Weakness (M62.81)  Difficulty in walking, Not elsewhere classified (R26.2)  INPATIENT: Payor: [facility] MEDICARE / Plan: [address] [place] [address] HMO / Product Type: Managed Care Medicare /   ASSESSMENT:     REHAB RECOMMENDATIONS:   Recommendation to date pending progress:  Setting:   No further skilled therapy   Equipment:    None     PRIOR LEVEL OF FUNCTION:  (Prior to Hospitalization)  INITIAL/CURRENT LEVEL OF FUNCTION:  (Based on today's evaluation)   Bathing:   Independent  Dressing:   Independent  Feeding/Grooming:   Independent  Toileting:   Independent  Functional Mobility:   Independent Bathing:   Minimal Assistance  Dressing:   Contact Guard Assistance  Feeding/Grooming:   Standby Assistance  Toileting:   Standby Assistance  Functional Mobility:   Standby Assistance     ASSESSMENT:  Mr. Olivia Bal is a 70 y/o male presents with pneumonia due to COVID-19. Pt currently on 15L Hi flow O2 NC and does not wear home O2. Today pt presents with decreased activity tolerance and increased O2 needs impacting ADLs.  Pt completed functional mobility of household distances with SBA no AD today with sats >90% throughout. Pt declined further ADLs today. Pt educated on energy conservation techniques for ADLs. Pt is currently functioning close to his baseline and would benefit from skilled OT services to address OT goals and plan of care. Likely no needs at d/c.      SUBJECTIVE:   Mr. Kiser Place states, \"We can keep walking. \"    SOCIAL HISTORY/LIVING ENVIRONMENT: lives with wife, 2 level home but lives on 1st level, tub shower, 1 recent fall, does not drive, no DME in the home       OBJECTIVE:     PAIN: VITAL SIGNS: LINES/DRAINS:   Pre Treatment: Pain Screen  Pain Scale 1: Numeric (0 - 10)  Pain Intensity 1: 0  Post Treatment: 0 Vital Signs  O2 Sat (%): 94 %  O2 Device: Hi flow nasal cannula  O2 Flow Rate (L/min): 15 l/min none  O2 Device: Hi flow nasal cannula     GROSS EVALUATION:  BUE Within Functional Limits Abnormal/ Functional Abnormal/ Non-Functional (see comments) Not Tested Comments:   AROM [x] [] [] []    PROM [] [] [] [x]    Strength [x] [] [] []    Balance [] [x] [] [] SBA   Posture [x] [] [] []    Sensation [x] [] [] []    Coordination [x] [] [] []    Tone [] [] [] []    Edema [] [] [] []    Activity Tolerance [] [x] [] [] 15L O2 NC    [] [] [] []      COGNITION/  PERCEPTION: Intact Impaired   (see comments) Comments:   Orientation [x] []    Vision [x] []    Hearing [x] []    Judgment/ Insight [] [x] Decreased insight into O2 needs   Attention [x] []    Memory [x] []    Command Following [x] []    Emotional Regulation [x] []     [] []      ACTIVITIES OF DAILY LIVING: I Mod I S SBA CGA Min Mod Max Total NT Comments   BASIC ADLs:              Bathing/ Showering [] [] [] [] [] [] [] [] [] [x]    Toileting [] [] [] [] [] [] [] [] [] [x]    Dressing [] [] [] [] [] [] [] [] [] [x]    Feeding [] [] [] [] [] [] [] [] [] [x]    Grooming [] [] [] [] [] [] [] [] [] [x]    Personal Device Care [] [] [] [] [] [] [] [] [] [x]    Functional Mobility [] [] [] [x] [] [] [] [] [] [] No AD   I=Independent, Mod I=Modified Independent, S=Supervision, SBA=Standby Assistance, CGA=Contact Guard Assistance,   Min=Minimal Assistance, Mod=Moderate Assistance, Max=Maximal Assistance, Total=Total Assistance, NT=Not Tested    MOBILITY: I Mod I S SBA CGA Min Mod Max Total  NT x2 Comments: pt received sitting in chair   Supine to sit [] [] [] [] [] [] [] [] [] [x] []    Sit to supine [] [] [] [] [] [] [] [] [] [x] []    Sit to stand [] [] [] [x] [] [] [] [] [] [] [] No AD   Bed to chair [] [] [] [x] [] [] [] [] [] [] [] No AD   I=Independent, Mod I=Modified Independent, S=Supervision, SBA=Standby Assistance, CGA=Contact Guard Assistance,   Min=Minimal Assistance, Mod=Moderate Assistance, Max=Maximal Assistance, Total=Total Assistance, NT=Not Tested    [address] [place] [address] AM-PAC 6 Clicks   Daily Activity Inpatient Short Form        How much help from another person does the patient currently need. .. Total A Lot A Little None   1. Putting on and taking off regular lower body clothing? [] 1   [] 2   [x] 3   [] 4   2. Bathing (including washing, rinsing, drying)? [] 1   [] 2   [x] 3   [] 4   3. Toileting, which includes using toilet, bedpan or urinal?   [] 1   [] 2   [] 3   [x] 4   4. Putting on and taking off regular upper body clothing? [] 1   [] 2   [] 3   [x] 4   5. Taking care of personal grooming such as brushing teeth? [] 1   [] 2   [] 3   [x] 4   6. Eating meals? [] 1   [] 2   [] 3   [x] 4   © 2007, Trustees of [address] [place] [id number], under license to [employer]. All rights reserved     Score:  Initial: 22 Most Recent: X (Date: -- )   Interpretation of Tool:  Represents activities that are increasingly more difficult (i.e. Bed mobility, Transfers, Gait).     PLAN:   FREQUENCY/DURATION: OT Plan of Care: 3 times/week for duration of hospital stay or until stated goals are met, whichever comes first.    PROBLEM LIST:   (Skilled intervention is medically necessary to address:)  1. Decreased ADL/Functional Activities  2. Decreased Activity Tolerance  3. Decreased Balance   INTERVENTIONS PLANNED:   (Benefits and precautions of occupational therapy have been discussed with the patient.)  1. Self Care Training  2. Therapeutic Activity  3. Therapeutic Exercise/HEP  4. Neuromuscular Re-education  5. Education     TREATMENT:     EVALUATION: Low Complexity : (Untimed Charge)    TREATMENT:   ( $$ Neuromuscular Re-Education: 8-22 mins   )  Neuromuscular Re-education (9 Minutes): Neuromuscular Re-education included Balance Training, Coordination training, Functional mobility with facilitation, Postural training, Sitting balance training and Standing balance training to improve Balance, Coordination, Functional Mobility and Postural Control.     TREATMENT GRID:  N/A    AFTER TREATMENT POSITION/PRECAUTIONS:  Chair, Needs within reach and RN notified    INTERDISCIPLINARY COLLABORATION:  RN/PCT, PT/PTA and OT/EVANS    TOTAL TREATMENT DURATION:  OT Patient Time In/Time Out  Time In: 1945  Time Out: [address],

## 2021-09-01 ENCOUNTER — APPOINTMENT (OUTPATIENT)
Dept: GENERAL RADIOLOGY | Age: 71
DRG: 177 | End: 2021-09-01
Attending: NURSE PRACTITIONER
Payer: MEDICARE

## 2021-09-01 LAB
ALBUMIN SERPL-MCNC: 2.1 G/DL (ref 3.2–4.6)
ALBUMIN/GLOB SERPL: 0.6 {RATIO} (ref 1.2–3.5)
ALP SERPL-CCNC: 58 U/L (ref 50–136)
ALT SERPL-CCNC: 29 U/L (ref 12–65)
ANION GAP SERPL CALC-SCNC: 2 MMOL/L (ref 7–16)
AST SERPL-CCNC: 21 U/L (ref 15–37)
BASOPHILS # BLD: 0 K/UL (ref 0–0.2)
BASOPHILS NFR BLD: 0 % (ref 0–2)
BILIRUB SERPL-MCNC: 0.4 MG/DL (ref 0.2–1.1)
BUN SERPL-MCNC: 21 MG/DL (ref 8–23)
CALCIUM SERPL-MCNC: 8.8 MG/DL (ref 8.3–10.4)
CHLORIDE SERPL-SCNC: 106 MMOL/L (ref 98–107)
CO2 SERPL-SCNC: 31 MMOL/L (ref 21–32)
CREAT SERPL-MCNC: 0.65 MG/DL (ref 0.8–1.5)
CRP SERPL HS-MCNC: 68.7 MG/L
D DIMER PPP FEU-MCNC: 1.54 UG/ML(FEU)
DIFFERENTIAL METHOD BLD: ABNORMAL
EOSINOPHIL # BLD: 0 K/UL (ref 0–0.8)
EOSINOPHIL NFR BLD: 0 % (ref 0.5–7.8)
ERYTHROCYTE [DISTWIDTH] IN BLOOD BY AUTOMATED COUNT: 13 % (ref 11.9–14.6)
GLOBULIN SER CALC-MCNC: 3.5 G/DL (ref 2.3–3.5)
GLUCOSE BLD STRIP.AUTO-MCNC: 201 MG/DL (ref 65–100)
GLUCOSE BLD STRIP.AUTO-MCNC: 212 MG/DL (ref 65–100)
GLUCOSE BLD STRIP.AUTO-MCNC: 228 MG/DL (ref 65–100)
GLUCOSE BLD STRIP.AUTO-MCNC: 239 MG/DL (ref 65–100)
GLUCOSE BLD STRIP.AUTO-MCNC: 296 MG/DL (ref 65–100)
GLUCOSE SERPL-MCNC: 206 MG/DL (ref 65–100)
HCT VFR BLD AUTO: 41.3 % (ref 41.1–50.3)
HGB BLD-MCNC: 13.5 G/DL (ref 13.6–17.2)
IMM GRANULOCYTES # BLD AUTO: 0.1 K/UL (ref 0–0.5)
IMM GRANULOCYTES NFR BLD AUTO: 1 % (ref 0–5)
LYMPHOCYTES # BLD: 0.6 K/UL (ref 0.5–4.6)
LYMPHOCYTES NFR BLD: 7 % (ref 13–44)
MCH RBC QN AUTO: 28.7 PG (ref 26.1–32.9)
MCHC RBC AUTO-ENTMCNC: 32.7 G/DL (ref 31.4–35)
MCV RBC AUTO: 87.9 FL (ref 79.6–97.8)
MONOCYTES # BLD: 0.2 K/UL (ref 0.1–1.3)
MONOCYTES NFR BLD: 3 % (ref 4–12)
NEUTS SEG # BLD: 7.6 K/UL (ref 1.7–8.2)
NEUTS SEG NFR BLD: 90 % (ref 43–78)
NRBC # BLD: 0 K/UL (ref 0–0.2)
PLATELET # BLD AUTO: 96 K/UL (ref 150–450)
PMV BLD AUTO: 10.9 FL (ref 9.4–12.3)
POTASSIUM SERPL-SCNC: 4 MMOL/L (ref 3.5–5.1)
PROT SERPL-MCNC: 5.6 G/DL (ref 6.3–8.2)
RBC # BLD AUTO: 4.7 M/UL (ref 4.23–5.6)
SERVICE CMNT-IMP: ABNORMAL
SODIUM SERPL-SCNC: 139 MMOL/L (ref 138–145)
WBC # BLD AUTO: 8.5 K/UL (ref 4.3–11.1)

## 2021-09-01 PROCEDURE — 80053 COMPREHEN METABOLIC PANEL: CPT

## 2021-09-01 PROCEDURE — 74011250636 HC RX REV CODE- 250/636: Performed by: HOSPITALIST

## 2021-09-01 PROCEDURE — 82962 GLUCOSE BLOOD TEST: CPT

## 2021-09-01 PROCEDURE — 74011000250 HC RX REV CODE- 250: Performed by: HOSPITALIST

## 2021-09-01 PROCEDURE — 94760 N-INVAS EAR/PLS OXIMETRY 1: CPT

## 2021-09-01 PROCEDURE — 86141 C-REACTIVE PROTEIN HS: CPT

## 2021-09-01 PROCEDURE — 85025 COMPLETE CBC W/AUTO DIFF WBC: CPT

## 2021-09-01 PROCEDURE — 74011636637 HC RX REV CODE- 636/637: Performed by: NURSE PRACTITIONER

## 2021-09-01 PROCEDURE — 74011250636 HC RX REV CODE- 250/636: Performed by: NURSE PRACTITIONER

## 2021-09-01 PROCEDURE — 85379 FIBRIN DEGRADATION QUANT: CPT

## 2021-09-01 PROCEDURE — 74011000258 HC RX REV CODE- 258: Performed by: HOSPITALIST

## 2021-09-01 PROCEDURE — 74011250637 HC RX REV CODE- 250/637: Performed by: EMERGENCY MEDICINE

## 2021-09-01 PROCEDURE — 97535 SELF CARE MNGMENT TRAINING: CPT

## 2021-09-01 PROCEDURE — 71045 X-RAY EXAM CHEST 1 VIEW: CPT

## 2021-09-01 PROCEDURE — 65270000029 HC RM PRIVATE

## 2021-09-01 PROCEDURE — 77010033711 HC HIGH FLOW OXYGEN

## 2021-09-01 PROCEDURE — 36415 COLL VENOUS BLD VENIPUNCTURE: CPT

## 2021-09-01 PROCEDURE — 97530 THERAPEUTIC ACTIVITIES: CPT

## 2021-09-01 RX ORDER — INSULIN LISPRO 100 [IU]/ML
16 INJECTION, SOLUTION INTRAVENOUS; SUBCUTANEOUS
Status: DISCONTINUED | OUTPATIENT
Start: 2021-09-01 | End: 2021-09-02

## 2021-09-01 RX ORDER — INSULIN GLARGINE 100 [IU]/ML
45 INJECTION, SOLUTION SUBCUTANEOUS
Status: DISCONTINUED | OUTPATIENT
Start: 2021-09-01 | End: 2021-09-03

## 2021-09-01 RX ADMIN — Medication 5 MG: at 21:30

## 2021-09-01 RX ADMIN — REMDESIVIR 100 MG: 100 INJECTION, POWDER, LYOPHILIZED, FOR SOLUTION INTRAVENOUS at 03:55

## 2021-09-01 RX ADMIN — Medication 10 ML: at 05:14

## 2021-09-01 RX ADMIN — DOXYCYCLINE 100 MG: 100 INJECTION, POWDER, LYOPHILIZED, FOR SOLUTION INTRAVENOUS at 13:35

## 2021-09-01 RX ADMIN — DEXAMETHASONE 6 MG: 4 TABLET ORAL at 13:28

## 2021-09-01 RX ADMIN — INSULIN LISPRO 4 UNITS: 100 INJECTION, SOLUTION INTRAVENOUS; SUBCUTANEOUS at 17:02

## 2021-09-01 RX ADMIN — DEXAMETHASONE 6 MG: 4 TABLET ORAL at 21:31

## 2021-09-01 RX ADMIN — INSULIN LISPRO 4 UNITS: 100 INJECTION, SOLUTION INTRAVENOUS; SUBCUTANEOUS at 08:51

## 2021-09-01 RX ADMIN — Medication 10 ML: at 21:27

## 2021-09-01 RX ADMIN — Medication 10 ML: at 13:28

## 2021-09-01 RX ADMIN — INSULIN LISPRO 6 UNITS: 100 INJECTION, SOLUTION INTRAVENOUS; SUBCUTANEOUS at 11:36

## 2021-09-01 RX ADMIN — INSULIN LISPRO 16 UNITS: 100 INJECTION, SOLUTION INTRAVENOUS; SUBCUTANEOUS at 17:02

## 2021-09-01 RX ADMIN — INSULIN LISPRO 4 UNITS: 100 INJECTION, SOLUTION INTRAVENOUS; SUBCUTANEOUS at 21:29

## 2021-09-01 RX ADMIN — DOXYCYCLINE 100 MG: 100 INJECTION, POWDER, LYOPHILIZED, FOR SOLUTION INTRAVENOUS at 01:49

## 2021-09-01 RX ADMIN — INSULIN LISPRO 10 UNITS: 100 INJECTION, SOLUTION INTRAVENOUS; SUBCUTANEOUS at 08:52

## 2021-09-01 RX ADMIN — DEXAMETHASONE 6 MG: 4 TABLET ORAL at 05:14

## 2021-09-01 RX ADMIN — INSULIN GLARGINE 45 UNITS: 100 INJECTION, SOLUTION SUBCUTANEOUS at 21:28

## 2021-09-01 RX ADMIN — INSULIN LISPRO 10 UNITS: 100 INJECTION, SOLUTION INTRAVENOUS; SUBCUTANEOUS at 11:36

## 2021-09-01 NOTE — PROGRESS NOTES
ACUTE OT GOALS:  (Developed with and agreed upon by patient and/or caregiver.)  1) Patient will complete lower body bathing and dressing with supervision and adaptive equipment as needed. 2) Patient will complete toileting with supervision. 3) Patient will complete functional transfers with supervision and adaptive equipment as needed. 4) Patient will tolerate at least 25 minutes of OT activity with as needed rest breaks while maintaining O2 sats >90%. 5) Patient will verbalize at least 3 energy conservation technique to utilize during ADL/IADL. Timeframe: 7 visits    OCCUPATIONAL THERAPY: Daily Note OT Treatment Day # 2    Eriberto Qiules is a 69 y.o. male   PRIMARY DIAGNOSIS: Pneumonia due to COVID-19 virus  Pneumonia due to COVID-19 virus [U07.1, J12.82]  Hypertension [I10]       Payor: [employer]s / Plan: [address] [place] [address] HMO / Product Type: Managed Care Medicare /   ASSESSMENT:     REHAB RECOMMENDATIONS: CURRENT LEVEL OF FUNCTION:  (Most Recently Demonstrated)   Recommendation to date pending progress:  [address] [place] [address] Therapy  Equipment:    None Bathing:   Not tested  Dressing:   Not tested  Feeding/Grooming:   Standby Assistance (in sitting)  Toileting:   Not tested  Functional Mobility:   Contact Guard Assistance     ASSESSMENT:  Mr. Sami Sanchez is progressing well towards OT goals but remains limited by respiratory status. Today, pt was received sitting in chair and required max encouragement from therapist for participation. Completed grooming task seated with SBA. Functional transfers and ambulation in room CGA. Pt noted to remove HFNC out of nose and drop to 82%. Able to recover back to mid 90s once properly placed in nose. Educated on leaving HFNC in nose--verbalized understanding. Mr. Sami Sanchez continues to demonstrate overall deficits in strength, balance, activity tolerance, and ADL performance.  Continue OT efforts and POC in order to address functional deficits and OT goals stated above. SUBJECTIVE:   Mr. Mary Gomez states, \"I dont really want to do it. \"    SOCIAL HISTORY/LIVING ENVIRONMENT:  lives with wife, 2 level home but lives on 1st level, tub shower, 1 recent fall, does not drive, no DME in the home     OBJECTIVE:     PAIN: VITAL SIGNS: LINES/DRAINS:   Pre Treatment: Pain Screen  Pain Scale 1: Numeric (0 - 10)  Pain Intensity 1: 0  Post Treatment: no change  Vital Signs  O2 Sat (%): 95 %  O2 Device: Hi flow nasal cannula  O2 Flow Rate (L/min): 15 l/min IV  O2 Device: Hi flow nasal cannula     ACTIVITIES OF DAILY LIVING: I Mod I S SBA CGA Min Mod Max Total NT Comments   BASIC ADLs:              Bathing/ Showering [] [] [] [] [] [] [] [] [] [x]    Toileting [] [] [] [] [] [] [] [] [] [x]    Dressing [] [] [] [] [] [] [] [] [] [x]    Feeding [] [] [] [] [] [] [] [] [] [x]    Grooming [] [] [] [x] [] [] [] [] [] [] Washed face sitting in chair    Personal Device Care [] [] [] [] [] [] [] [] [] []    Functional Mobility [] [] [] [] [x] [] [] [] [] [] Ambulated in room CGA while pushing IV pole   I=Independent, Mod I=Modified Independent, S=Supervision, SBA=Standby Assistance, CGA=Contact Guard Assistance,   Min=Minimal Assistance, Mod=Moderate Assistance, Max=Maximal Assistance, Total=Total Assistance, NT=Not Tested    MOBILITY: I Mod I S SBA CGA Min Mod Max Total  NT x2 Comments:   Supine to sit [] [] [] [] [] [] [] [] [] [x] [] Received in chair    Sit to supine [] [] [] [] [] [] [] [] [] [x] [] Left in chair    Sit to stand [] [] [] [] [x] [] [] [] [] [] []    Bed to chair [] [] [] [] [] [] [] [] [] [] [] CGA for ambulation in room   I=Independent, Mod I=Modified Independent, S=Supervision, SBA=Standby Assistance, CGA=Contact Guard Assistance,   Min=Minimal Assistance, Mod=Moderate Assistance, Max=Maximal Assistance, Total=Total Assistance, NT=Not Tested    BALANCE: Good Fair+ Fair Fair- Poor NT Comments   Sitting Static [x] [] [] [] [] []    Sitting Dynamic [] [x] [] [] [] [] Standing Static [] [x] [] [] [] []    Standing Dynamic [] [x] [] [] [] []      PLAN:   FREQUENCY/DURATION: OT Plan of Care: 3 times/week for duration of hospital stay or until stated goals are met, whichever comes first.    TREATMENT:   TREATMENT:   ($$ Self Care/Home Management: 8-22 mins$$ Therapeutic Activity: 8-22 mins   )  Therapeutic Activity (16 Minutes): Therapeutic activity included Transfer Training, Ambulation on level ground, Sitting balance  and Standing balance to improve functional Mobility, Strength and Activity tolerance. Self Care (10 Minutes): Self care including Grooming and Energy Conservation Training to increase independence and decrease level of assistance required.     TREATMENT GRID:  N/A    AFTER TREATMENT POSITION/PRECAUTIONS:  Chair, Needs within reach and RN notified    INTERDISCIPLINARY COLLABORATION:  RN/PCT and OT/EVANS    TOTAL TREATMENT DURATION:  OT Patient Time In/Time Out  Time In: 5115  Time Out: [facility]

## 2021-09-01 NOTE — PROGRESS NOTES
CM chart review. Patient currently requiring 15L O2. No further therapy needs, per PT/OT. CM will follow to assist with discharge needs.

## 2021-09-01 NOTE — PROGRESS NOTES
Problem: Falls - Risk of  Goal: *Absence of Falls  Description: Document Bakari Suazo Fall Risk and appropriate interventions in the flowsheet. Outcome: Progressing Towards Goal  Note: Fall Risk Interventions:  Mobility Interventions: Bed/chair exit alarm, Patient to call before getting OOB    Mentation Interventions: Adequate sleep, hydration, pain control    Medication Interventions: Bed/chair exit alarm    Elimination Interventions: Bed/chair exit alarm, Call light in reach    History of Falls Interventions: Bed/chair exit alarm         Problem: Patient Education: Go to Patient Education Activity  Goal: Patient/Family Education  Outcome: Progressing Towards Goal     Problem: Airway Clearance - Ineffective  Goal: Achieve or maintain patent airway  Outcome: Progressing Towards Goal     Problem: Gas Exchange - Impaired  Goal: Absence of hypoxia  Outcome: Progressing Towards Goal  Goal: Promote optimal lung function  Outcome: Progressing Towards Goal     Problem: Breathing Pattern - Ineffective  Goal: Ability to achieve and maintain a regular respiratory rate  Outcome: Progressing Towards Goal     Problem:  Body Temperature -  Risk of, Imbalanced  Goal: Ability to maintain a body temperature within defined limits  Outcome: Progressing Towards Goal  Goal: Will regain or maintain usual level of consciousness  Outcome: Progressing Towards Goal  Goal: Complications related to the disease process, condition or treatment will be avoided or minimized  Outcome: Progressing Towards Goal     Problem: Isolation Precautions - Risk of Spread of Infection  Goal: Prevent transmission of infectious organism to others  Outcome: Progressing Towards Goal     Problem: Nutrition Deficits  Goal: Optimize nutrtional status  Outcome: Progressing Towards Goal     Problem: Risk for Fluid Volume Deficit  Goal: Maintain normal heart rhythm  Outcome: Progressing Towards Goal  Goal: Maintain absence of muscle cramping  Outcome: Progressing Towards Goal  Goal: Maintain normal serum potassium, sodium, calcium, phosphorus, and pH  Outcome: Progressing Towards Goal     Problem: Loneliness or Risk for Loneliness  Goal: Demonstrate positive use of time alone when socialization is not possible  Outcome: Progressing Towards Goal     Problem: Fatigue  Goal: Verbalize increase energy and improved vitality  Outcome: Progressing Towards Goal     Problem: Patient Education: Go to Patient Education Activity  Goal: Patient/Family Education  Outcome: Progressing Towards Goal     Problem: Patient Education: Go to Patient Education Activity  Goal: Patient/Family Education  Outcome: Progressing Towards Goal     Problem: Pneumonia: Day 1  Goal: Off Pathway (Use only if patient is Off Pathway)  Outcome: Progressing Towards Goal  Goal: Activity/Safety  Outcome: Progressing Towards Goal  Goal: Consults, if ordered  Outcome: Progressing Towards Goal  Goal: Diagnostic Test/Procedures  Outcome: Progressing Towards Goal  Goal: Nutrition/Diet  Outcome: Progressing Towards Goal  Goal: Medications  Outcome: Progressing Towards Goal  Goal: Respiratory  Outcome: Progressing Towards Goal  Goal: Treatments/Interventions/Procedures  Outcome: Progressing Towards Goal  Goal: Psychosocial  Outcome: Progressing Towards Goal  Goal: *Oxygen saturation within defined limits  Outcome: Progressing Towards Goal  Goal: *Influenza vaccine administered (October-March)  Outcome: Progressing Towards Goal  Goal: *Pneumoccocal vaccine administered  Outcome: Progressing Towards Goal  Goal: *Hemodynamically stable  Outcome: Progressing Towards Goal  Goal: *Demonstrates progressive activity  Outcome: Progressing Towards Goal  Goal: *Tolerating diet  Outcome: Progressing Towards Goal     Problem: Pneumonia: Day 2  Goal: Off Pathway (Use only if patient is Off Pathway)  Outcome: Progressing Towards Goal  Goal: Activity/Safety  Outcome: Progressing Towards Goal  Goal: Consults, if ordered  Outcome: Progressing Towards Goal  Goal: Diagnostic Test/Procedures  Outcome: Progressing Towards Goal  Goal: Nutrition/Diet  Outcome: Progressing Towards Goal  Goal: Discharge Planning  Outcome: Progressing Towards Goal  Goal: Medications  Outcome: Progressing Towards Goal  Goal: Respiratory  Outcome: Progressing Towards Goal  Goal: Treatments/Interventions/Procedures  Outcome: Progressing Towards Goal  Goal: Psychosocial  Outcome: Progressing Towards Goal  Goal: *Oxygen saturation within defined limits  Outcome: Progressing Towards Goal  Goal: *Hemodynamically stable  Outcome: Progressing Towards Goal  Goal: *Demonstrates progressive activity  Outcome: Progressing Towards Goal  Goal: *Tolerating diet  Outcome: Progressing Towards Goal  Goal: *Optimal pain control at patient's stated goal  Outcome: Progressing Towards Goal     Problem: Pneumonia: Day 3  Goal: Off Pathway (Use only if patient is Off Pathway)  Outcome: Progressing Towards Goal  Goal: Activity/Safety  Outcome: Progressing Towards Goal  Goal: Consults, if ordered  Outcome: Progressing Towards Goal  Goal: Diagnostic Test/Procedures  Outcome: Progressing Towards Goal  Goal: Nutrition/Diet  Outcome: Progressing Towards Goal  Goal: Discharge Planning  Outcome: Progressing Towards Goal  Goal: Medications  Outcome: Progressing Towards Goal  Goal: Respiratory  Outcome: Progressing Towards Goal  Goal: Treatments/Interventions/Procedures  Outcome: Progressing Towards Goal  Goal: Psychosocial  Outcome: Progressing Towards Goal  Goal: *Oxygen saturation within defined limits  Outcome: Progressing Towards Goal  Goal: *Hemodynamically stable  Outcome: Progressing Towards Goal  Goal: *Demonstrates progressive activity  Outcome: Progressing Towards Goal  Goal: *Tolerating diet  Outcome: Progressing Towards Goal  Goal: *Describes available resources and support systems  Outcome: Progressing Towards Goal  Goal: *Optimal pain control at patient's stated goal  Outcome: Progressing Towards Goal     Problem: Pneumonia: Day 4  Goal: Off Pathway (Use only if patient is Off Pathway)  Outcome: Progressing Towards Goal  Goal: Activity/Safety  Outcome: Progressing Towards Goal  Goal: Nutrition/Diet  Outcome: Progressing Towards Goal  Goal: Discharge Planning  Outcome: Progressing Towards Goal  Goal: Medications  Outcome: Progressing Towards Goal  Goal: Respiratory  Outcome: Progressing Towards Goal  Goal: Treatments/Interventions/Procedures  Outcome: Progressing Towards Goal  Goal: Psychosocial  Outcome: Progressing Towards Goal     Problem: Pneumonia: Discharge Outcomes  Goal: *Demonstrates progressive activity  Outcome: Progressing Towards Goal  Goal: *Describes follow-up/return visits to physicians  Outcome: Progressing Towards Goal  Goal: *Tolerating diet  Outcome: Progressing Towards Goal  Goal: *Verbalizes name, dosage, time, side effects, and number of days to continue medications  Outcome: Progressing Towards Goal  Goal: *Influenza immunization  Outcome: Progressing Towards Goal  Goal: *Pneumococcal immunization  Outcome: Progressing Towards Goal  Goal: *Respiratory status at baseline  Outcome: Progressing Towards Goal  Goal: *Vital signs within defined limits  Outcome: Progressing Towards Goal  Goal: *Describes available resources and support systems  Outcome: Progressing Towards Goal  Goal: *Optimal pain control at patient's stated goal  Outcome: Progressing Towards Goal

## 2021-09-01 NOTE — PROGRESS NOTES
Hospitalist Progress Note   Admit Date:  2021  9:30 PM   Name:  Eriberto Quiles   Age:  69 y.o. Sex:  male  :  1950   MRN:  [id number]     Presenting Complaint: Shortness of Breath    Reason(s) for Admission: Pneumonia due to COVID-19 virus [U07.1, J12.82]  Hypertension [I10]     Hospital Course & Interval History:   69year old CM PMH DM, HTN has no PCP admitted  for acute respiratory failure with hypoxia. He was dx with COVID 19  at outpatient. Per EMS, he was noted with saturations mid 80s, does not require oxygen at baseline. He has had 1 vaccine with 2nd due next week. He was started on remdesivir, dexamethasone, rocephin and doxy. Chest CT  with COVID pneumonia and small effusion, received lasix. Subjective (21):  Up to 15 liters O2 today. Wanting to go home. Was wanting to sign out AMA however was able to talk to him and he agrees to stay. CRP trending down. Ddimer slightly up. Denies CP, n/v/d, abd pain. Assessment & Plan:     Acute respiratory failure with hypoxia related to COVID 19 infection:  Dx , requiring oxygen which is new for him   procal 2.01, ABT will continue. leigionella and mycoplasma, strep pending. Day 2 of Remdesivir  Encourage IS use, deep breathing   requiring higher oxygen use, now on 12 liters with sats mid 90s. CT chest COVID PNA, right small pleural effusion. Increasing Dexamethasone to 6 mg Q8h  Lasix x 1 IV  Check BNP  Day 3 of Remdesivir  Day 4 Rocephin, Day 3 Doxycycline  COVID labs improving to include Procal reduction.  Rocephin EOT today. Doxy EOT . Continue steroids. remdesivir EOT . Increase in O2 demands today to 14 L.      up to 15 liters. Repeat CRP down, ddimer slightly up. Repeat CXR in AM.      2. DM2:  A1C on  from doctor office noted 12  SSI   Adding Lantus insulin 15 units nightly.   *Patient on Dexamethasone so expect increase in BS   increasing Lantus to 20 units nightly, diabetic educator consult placed. 8/30 remains elevated with steroid use, adding 3 units premeal and increasing Lantus to 30 units nightly. 8/31 increase prandial insulin to humalog 6 units with meals  9/1 increase lantus to 45 units, increase prandial to 16 units with meals     3. Essential HTN:  Well controlled, not on any medications. Trend     4. Thrombocytopenia:  Noted 26  On 8/24 noted 98  Likely related to [place] 19 infection  Patient states having a H/O same over 10 years ago  Trend for now, transfuse if less than 10  8/29 now 67, pathologist review smear pending  8/30 slowly improving now 80, pathologist review still pending  8/31 pathology smear. Normocytic/normochromic, adequate platelets     5.  Small right pleural effusion:  Noted on CT chest 8/30  Check BNP  Give Lasix 40 mg IV x 1  I&O         Diet:  DIET ADULT ORAL NUTRITION SUPPLEMENT  DIET ADULT  DVT PPx: SCD given thrombocytopenia  Code status: Full Code  Disposition/Expected LOS: 1-2 days    Active Hospital Problems    Diagnosis Date Noted    DM (diabetes mellitus) ([facility] [address] 08/28/2021    Hypertension 08/27/2021    Pneumonia due to COVID-19 virus 08/27/2021    Thrombocytopenia ([facility] [address] 08/27/2021    Acute respiratory failure with hypoxia ([facility] [address] 08/27/2021       Objective:     Patient Vitals for the past 24 hrs:   Temp Pulse Resp BP SpO2   09/01/21 1103 97.6 °F (36.4 °C) 72 18 (!) 146/70 94 %   09/01/21 0716 97.8 °F (36.6 °C) 68 19 132/78 90 %   09/01/21 0456 97.3 °F (36.3 °C) 65 18 120/64 (!) 89 %   08/31/21 2347 97.2 °F (36.2 °C) 70 18 127/80 91 %   08/31/21 1919 97.4 °F (36.3 °C) 80 18 129/71 93 %   08/31/21 1735     91 %   08/31/21 1732     (!) 82 %   08/31/21 1730     (!) 74 %   08/31/21 1634 (!) 96.4 °F (35.8 °C) 70 28 121/71 96 %   08/31/21 1500 (!) 96.4 °F (35.8 °C) 70 28 121/71 96 %   08/31/21 1400     94 %   08/31/21 1330     95 %   08/31/21 1203 97 °F (36.1 °C) 76 28 114/77 93 %     Oxygen Therapy  O2 Sat (%): 94 % (09/01/21 1103)  Pulse via Oximetry: 65 beats per minute (09/01/21 0456)  O2 Device: Hi flow nasal cannula (09/01/21 0716)  Skin Assessment: Clean, dry, & intact (08/31/21 0427)  Skin Protection for O2 Device: No (08/31/21 0427)  O2 Flow Rate (L/min): 15 l/min (09/01/21 0716)    Estimated body mass index is 35.83 kg/m² as calculated from the following:    Height as of this encounter: 5' 6\" (1.676 m). Weight as of this encounter: 100.7 kg (222 lb 0.1 oz). Intake/Output Summary (Last 24 hours) at 9/1/2021 1152  Last data filed at 8/31/2021 1415  Gross per 24 hour   Intake 250 ml   Output 600 ml   Net -350 ml         Physical Exam:     General:          No acute distress, speaking in full sentences, no use of accessory muscles   Lungs:             Diminished lower lobes bilat. resp even and nonlabored  CV:                  Regular rate and rhythm with normal S1 and S2. No LE edema. No murmurs rubs gallops.      Abdomen:        Soft, nontender, nondistended, normoactive bowel sounds   Extremities:     No cyanosis clubbing or edema   Neuro:             Nonfocal, A&O x3   Psych:             Normal affect       I have reviewed ordered lab tests and independently visualized imaging below:    Last 24hr Labs:  Recent Results (from the past 24 hour(s))   GLUCOSE, POC    Collection Time: 08/31/21  4:04 PM   Result Value Ref Range    Glucose (POC) 293 (H) 65 - 100 mg/dL    Performed by Lucía    GLUCOSE, POC    Collection Time: 08/31/21  9:03 PM   Result Value Ref Range    Glucose (POC) 225 (H) 65 - 100 mg/dL    Performed by Brent Taylor    D DIMER    Collection Time: 09/01/21  5:23 AM   Result Value Ref Range    D DIMER 1.54 (H) <0.56 ug/ml(FEU)   CRP, HIGH SENSITIVITY    Collection Time: 09/01/21  5:23 AM   Result Value Ref Range    CRP, High sensitivity 68.7 mg/L   CBC WITH AUTOMATED DIFF    Collection Time: 09/01/21  5:24 AM   Result Value Ref Range    WBC 8.5 4.3 - 11.1 K/uL    RBC 4.70 4.23 - 5.6 M/uL    HGB 13.5 (L) 13.6 - 17.2 g/dL    HCT 41.3 41.1 - 50.3 %    MCV 87.9 79.6 - 97.8 FL    MCH 28.7 26.1 - 32.9 PG    MCHC 32.7 31.4 - 35.0 g/dL    RDW 13.0 11.9 - 14.6 %    PLATELET 96 (L) 721 - 450 K/uL    MPV 10.9 9.4 - 12.3 FL    ABSOLUTE NRBC 0.00 0.0 - 0.2 K/uL    DF AUTOMATED      NEUTROPHILS 90 (H) 43 - 78 %    LYMPHOCYTES 7 (L) 13 - 44 %    MONOCYTES 3 (L) 4.0 - 12.0 %    EOSINOPHILS 0 (L) 0.5 - 7.8 %    BASOPHILS 0 0.0 - 2.0 %    IMMATURE GRANULOCYTES 1 0.0 - 5.0 %    ABS. NEUTROPHILS 7.6 1.7 - 8.2 K/UL    ABS. LYMPHOCYTES 0.6 0.5 - 4.6 K/UL    ABS. MONOCYTES 0.2 0.1 - 1.3 K/UL    ABS. EOSINOPHILS 0.0 0.0 - 0.8 K/UL    ABS. BASOPHILS 0.0 0.0 - 0.2 K/UL    ABS. IMM. GRANS. 0.1 0.0 - 0.5 K/UL   METABOLIC PANEL, COMPREHENSIVE    Collection Time: 09/01/21  5:24 AM   Result Value Ref Range    Sodium 139 138 - 145 mmol/L    Potassium 4.0 3.5 - 5.1 mmol/L    Chloride 106 98 - 107 mmol/L    CO2 31 21 - 32 mmol/L    Anion gap 2 (L) 7 - 16 mmol/L    Glucose 206 (H) 65 - 100 mg/dL    BUN 21 8 - 23 MG/DL    Creatinine 0.65 (L) 0.8 - 1.5 MG/DL    GFR est AA >60 >60 ml/min/1.73m2    GFR est non-AA >60 >60 ml/min/1.73m2    Calcium 8.8 8.3 - 10.4 MG/DL    Bilirubin, total 0.4 0.2 - 1.1 MG/DL    ALT (SGPT) 29 12 - 65 U/L    AST (SGOT) 21 15 - 37 U/L    Alk.  phosphatase 58 50 - 136 U/L    Protein, total 5.6 (L) 6.3 - 8.2 g/dL    Albumin 2.1 (L) 3.2 - 4.6 g/dL    Globulin 3.5 2.3 - 3.5 g/dL    A-G Ratio 0.6 (L) 1.2 - 3.5     GLUCOSE, POC    Collection Time: 09/01/21  7:20 AM   Result Value Ref Range    Glucose (POC) 201 (H) 65 - 100 mg/dL    Performed by Eliud    GLUCOSE, POC    Collection Time: 09/01/21  8:40 AM   Result Value Ref Range    Glucose (POC) 228 (H) 65 - 100 mg/dL    Performed by [address], POC    Collection Time: 09/01/21 11:07 AM   Result Value Ref Range    Glucose (POC) 296 (H) 65 - 100 mg/dL    Performed by Eliud        All Micro Results     Procedure Component Value Units Date/Time    MYCOPLASMA AB, IGM [[id number]] Collected: 08/28/21 1127    Order Status: Completed Specimen: Serum Updated: 08/31/21 0135     M. pneumoniae Ab, IgM <770 U/mL      Comment: (NOTE)                              Negative            <770  Clinically significant amount of M. pneumoniae antibody  not detected. Low Positive   770 - 12  M. pneumoniae specific IgM presumptively detected. It  is recommended that another sample be collected 1-2  weeks later to assure reactivity. Positive            >950  Highly significant amount of M. pneumoniae specific  IgM antibody detected. Performed At: [address] [id number]  Rosie Ernandez MD QP:[id number]         Jefersonjasmeet Thompson [[id number]] Collected: 08/27/21 2330    Order Status: Canceled Specimen: Urine     SClemencia Hutchison [place]ide[place], UR/CSF [[id number]] Collected: 08/27/21 2330    Order Status: Rosalia Lai AB, IGM [[id number]] Collected: 08/27/21 2325    Order Status: Canceled Specimen: Serum           Other Studies:  No results found.     Current Meds:  Current Facility-Administered Medications   Medication Dose Route Frequency    insulin lispro (HUMALOG) injection 10 Units  10 Units SubCUTAneous TIDAC    insulin glargine (LANTUS) injection 35 Units  35 Units SubCUTAneous QHS    dexAMETHasone (DECADRON) tablet 6 mg  6 mg Oral Q8H    melatonin tablet 5 mg  5 mg Oral QHS    insulin lispro (HUMALOG) injection   SubCUTAneous AC&HS    sodium chloride (NS) flush 5-40 mL  5-40 mL IntraVENous Q8H    sodium chloride (NS) flush 5-40 mL  5-40 mL IntraVENous PRN    acetaminophen (TYLENOL) tablet 650 mg  650 mg Oral Q6H PRN    Or    acetaminophen (TYLENOL) suppository 650 mg  650 mg Rectal Q6H PRN    polyethylene glycol (MIRALAX) packet 17 g  17 g Oral DAILY PRN    ondansetron (ZOFRAN ODT) tablet 4 mg  4 mg Oral Q8H PRN    Or    ondansetron (ZOFRAN) injection 4 mg  4 mg IntraVENous Q6H PRN    doxycycline (VIBRAMYCIN) 100 mg in 0.9% sodium chloride (MBP/ADV) 100 mL MBP  100 mg IntraVENous Q12H       Signed:  Jessica St NP    Notes, labs, VS, diagnostic testing reviewed  Case discussed with pt, care team, Dr. Jose Loomis

## 2021-09-01 NOTE — DIABETES MGMT
Patient admitted with pneumonia due to COVID-19. Blood glucose ranged 168-305 yesterday with patient receiving Lantus 35 units, Humalog 49 units, and Decadron 18mg. Blood glucose this morning was 201. Most recent FSBS 296. Reviewed patient current regimen: Lantus 35 units nightly, Humalog 16 units with meals, Humalog correctional insulin, and Decadron 6mg q8h. Patient would likely benefit from an increase in basal insulin as fasting blood glucose is not at goal. Patient would also likely benefit from an increase in prandial insulin to help offset hyperglycemia during the day related to caloric intake and steroid therapy. Provider updated via Science regarding recommendations and patient glycemic control. New orders received to increase Lantus to 45 units nightly and increase prandial insulin to Humalog 16 units with meals.

## 2021-09-01 NOTE — PROGRESS NOTES
Hourly rounds performed during shift, pt denies any needs at this time. Bed in low and locked position, call light and personal items within reach. Pt on 15 Liters HFNC. Pt encouraged and educated on proning, TCDB and hourly IS use while awake, pt states \"I can't lay on my stomach\". Pt able to pull 750 on IS. Will continue to monitor and give bedside report to oncoming shift nurse.

## 2021-09-02 ENCOUNTER — HOME HEALTH ADMISSION (OUTPATIENT)
Dept: HOME HEALTH SERVICES | Facility: HOME HEALTH | Age: 71
End: 2021-09-02
Payer: MEDICARE

## 2021-09-02 LAB
ALBUMIN SERPL-MCNC: 2.2 G/DL (ref 3.2–4.6)
ALBUMIN/GLOB SERPL: 0.6 {RATIO} (ref 1.2–3.5)
ALP SERPL-CCNC: 63 U/L (ref 50–136)
ALT SERPL-CCNC: 32 U/L (ref 12–65)
ANION GAP SERPL CALC-SCNC: 3 MMOL/L (ref 7–16)
AST SERPL-CCNC: 21 U/L (ref 15–37)
BASOPHILS # BLD: 0 K/UL (ref 0–0.2)
BASOPHILS NFR BLD: 0 % (ref 0–2)
BILIRUB SERPL-MCNC: 0.5 MG/DL (ref 0.2–1.1)
BUN SERPL-MCNC: 21 MG/DL (ref 8–23)
CALCIUM SERPL-MCNC: 8.9 MG/DL (ref 8.3–10.4)
CHLORIDE SERPL-SCNC: 108 MMOL/L (ref 98–107)
CO2 SERPL-SCNC: 29 MMOL/L (ref 21–32)
CREAT SERPL-MCNC: 0.57 MG/DL (ref 0.8–1.5)
CRP SERPL HS-MCNC: 41.3 MG/L
D DIMER PPP FEU-MCNC: 5.73 UG/ML(FEU)
DIFFERENTIAL METHOD BLD: ABNORMAL
EOSINOPHIL # BLD: 0 K/UL (ref 0–0.8)
EOSINOPHIL NFR BLD: 0 % (ref 0.5–7.8)
ERYTHROCYTE [DISTWIDTH] IN BLOOD BY AUTOMATED COUNT: 12.9 % (ref 11.9–14.6)
EST. AVERAGE GLUCOSE BLD GHB EST-MCNC: 303 MG/DL
GLOBULIN SER CALC-MCNC: 3.7 G/DL (ref 2.3–3.5)
GLUCOSE BLD STRIP.AUTO-MCNC: 119 MG/DL (ref 65–100)
GLUCOSE BLD STRIP.AUTO-MCNC: 223 MG/DL (ref 65–100)
GLUCOSE BLD STRIP.AUTO-MCNC: 304 MG/DL (ref 65–100)
GLUCOSE BLD STRIP.AUTO-MCNC: 371 MG/DL (ref 65–100)
GLUCOSE SERPL-MCNC: 117 MG/DL (ref 65–100)
HBA1C MFR BLD: 12.2 % (ref 4.2–6.3)
HCT VFR BLD AUTO: 43.1 % (ref 41.1–50.3)
HGB BLD-MCNC: 14.6 G/DL (ref 13.6–17.2)
IMM GRANULOCYTES # BLD AUTO: 0.1 K/UL (ref 0–0.5)
IMM GRANULOCYTES NFR BLD AUTO: 1 % (ref 0–5)
LYMPHOCYTES # BLD: 0.6 K/UL (ref 0.5–4.6)
LYMPHOCYTES NFR BLD: 4 % (ref 13–44)
MCH RBC QN AUTO: 29.7 PG (ref 26.1–32.9)
MCHC RBC AUTO-ENTMCNC: 33.9 G/DL (ref 31.4–35)
MCV RBC AUTO: 87.6 FL (ref 79.6–97.8)
MONOCYTES # BLD: 0.4 K/UL (ref 0.1–1.3)
MONOCYTES NFR BLD: 3 % (ref 4–12)
NEUTS SEG # BLD: 13.3 K/UL (ref 1.7–8.2)
NEUTS SEG NFR BLD: 92 % (ref 43–78)
NRBC # BLD: 0 K/UL (ref 0–0.2)
PLATELET # BLD AUTO: 89 K/UL (ref 150–450)
PLATELET COMMENTS,PCOM: ABNORMAL
PMV BLD AUTO: 11.4 FL (ref 9.4–12.3)
POTASSIUM SERPL-SCNC: 4.2 MMOL/L (ref 3.5–5.1)
PROT SERPL-MCNC: 5.9 G/DL (ref 6.3–8.2)
RBC # BLD AUTO: 4.92 M/UL (ref 4.23–5.6)
RBC MORPH BLD: ABNORMAL
SERVICE CMNT-IMP: ABNORMAL
SODIUM SERPL-SCNC: 140 MMOL/L (ref 138–145)
WBC # BLD AUTO: 14.4 K/UL (ref 4.3–11.1)
WBC MORPH BLD: ABNORMAL

## 2021-09-02 PROCEDURE — 97116 GAIT TRAINING THERAPY: CPT

## 2021-09-02 PROCEDURE — 83036 HEMOGLOBIN GLYCOSYLATED A1C: CPT

## 2021-09-02 PROCEDURE — 85025 COMPLETE CBC W/AUTO DIFF WBC: CPT

## 2021-09-02 PROCEDURE — 77010033678 HC OXYGEN DAILY

## 2021-09-02 PROCEDURE — 65270000029 HC RM PRIVATE

## 2021-09-02 PROCEDURE — 82962 GLUCOSE BLOOD TEST: CPT

## 2021-09-02 PROCEDURE — 77010033711 HC HIGH FLOW OXYGEN

## 2021-09-02 PROCEDURE — 85379 FIBRIN DEGRADATION QUANT: CPT

## 2021-09-02 PROCEDURE — 74011636637 HC RX REV CODE- 636/637: Performed by: NURSE PRACTITIONER

## 2021-09-02 PROCEDURE — 94760 N-INVAS EAR/PLS OXIMETRY 1: CPT

## 2021-09-02 PROCEDURE — 74011250636 HC RX REV CODE- 250/636: Performed by: NURSE PRACTITIONER

## 2021-09-02 PROCEDURE — 36415 COLL VENOUS BLD VENIPUNCTURE: CPT

## 2021-09-02 PROCEDURE — 97530 THERAPEUTIC ACTIVITIES: CPT

## 2021-09-02 PROCEDURE — 97535 SELF CARE MNGMENT TRAINING: CPT

## 2021-09-02 PROCEDURE — 80053 COMPREHEN METABOLIC PANEL: CPT

## 2021-09-02 PROCEDURE — 74011636637 HC RX REV CODE- 636/637: Performed by: PHYSICIAN ASSISTANT

## 2021-09-02 PROCEDURE — 86141 C-REACTIVE PROTEIN HS: CPT

## 2021-09-02 PROCEDURE — 74011250637 HC RX REV CODE- 250/637: Performed by: EMERGENCY MEDICINE

## 2021-09-02 RX ORDER — INSULIN LISPRO 100 [IU]/ML
20 INJECTION, SOLUTION INTRAVENOUS; SUBCUTANEOUS
Status: DISCONTINUED | OUTPATIENT
Start: 2021-09-02 | End: 2021-09-06

## 2021-09-02 RX ADMIN — Medication 10 ML: at 21:37

## 2021-09-02 RX ADMIN — DEXAMETHASONE 6 MG: 4 TABLET ORAL at 13:30

## 2021-09-02 RX ADMIN — DEXAMETHASONE 6 MG: 4 TABLET ORAL at 21:31

## 2021-09-02 RX ADMIN — INSULIN LISPRO 4 UNITS: 100 INJECTION, SOLUTION INTRAVENOUS; SUBCUTANEOUS at 21:31

## 2021-09-02 RX ADMIN — INSULIN GLARGINE 45 UNITS: 100 INJECTION, SOLUTION SUBCUTANEOUS at 21:33

## 2021-09-02 RX ADMIN — Medication 10 ML: at 05:21

## 2021-09-02 RX ADMIN — Medication 10 ML: at 13:30

## 2021-09-02 RX ADMIN — INSULIN LISPRO 10 UNITS: 100 INJECTION, SOLUTION INTRAVENOUS; SUBCUTANEOUS at 16:16

## 2021-09-02 RX ADMIN — INSULIN LISPRO 16 UNITS: 100 INJECTION, SOLUTION INTRAVENOUS; SUBCUTANEOUS at 11:49

## 2021-09-02 RX ADMIN — Medication 5 MG: at 21:31

## 2021-09-02 RX ADMIN — DEXAMETHASONE 6 MG: 4 TABLET ORAL at 05:19

## 2021-09-02 RX ADMIN — INSULIN LISPRO 20 UNITS: 100 INJECTION, SOLUTION INTRAVENOUS; SUBCUTANEOUS at 16:15

## 2021-09-02 RX ADMIN — INSULIN LISPRO 16 UNITS: 100 INJECTION, SOLUTION INTRAVENOUS; SUBCUTANEOUS at 08:20

## 2021-09-02 RX ADMIN — INSULIN LISPRO 8 UNITS: 100 INJECTION, SOLUTION INTRAVENOUS; SUBCUTANEOUS at 11:50

## 2021-09-02 NOTE — PROGRESS NOTES
ACUTE OT GOALS:  (Developed with and agreed upon by patient and/or caregiver.)  1) Patient will complete lower body bathing and dressing with supervision and adaptive equipment as needed. 2) Patient will complete toileting with supervision. 3) Patient will complete functional transfers with supervision and adaptive equipment as needed. 4) Patient will tolerate at least 25 minutes of OT activity with as needed rest breaks while maintaining O2 sats >90%. 5) Patient will verbalize at least 3 energy conservation technique to utilize during ADL/IADL. Timeframe: 7 visits    OCCUPATIONAL THERAPY: Daily Note OT Treatment Day # 3    Ml Gallegos is a 69 y.o. male   PRIMARY DIAGNOSIS: Pneumonia due to COVID-19 virus  Pneumonia due to COVID-19 virus [U07.1, J12.82]  Hypertension [I10]       Payor: [employer] MEDICARE / Plan: [address] [place] [address] HMO / Product Type: Managed Care Medicare /   ASSESSMENT:     REHAB RECOMMENDATIONS: CURRENT LEVEL OF FUNCTION:  (Most Recently Demonstrated)   Recommendation to date pending progress:  [address] [place] [address] Therapy  Equipment:    None Bathing:   Not tested  Dressing:   Not tested  Feeding/Grooming:   Contact Guard Assistance   Toileting:   Not tested  Functional Mobility:   Contact Guard Assistance     ASSESSMENT:  Mr. Selena Yee is progressing well towards OT goals but remains limited by respiratory status. Today, pt was received sitting in chair and continued to require max encouragement from therapist for participation. Functional transfers and ambulation with no AD CGA. Grooming tasks standing at sink CGA. Pt's O2 sats remained in the 90s throughout entire session on 13L HFNC. Mr. Selena Yee continues to demonstrate overall deficits in strength, balance, activity tolerance, and ADL performance. Continue OT efforts and POC in order to address functional deficits and OT goals stated above. SUBJECTIVE:   Mr. Selena Yee states, \"I just want to go home. \"    SOCIAL HISTORY/LIVING ENVIRONMENT:  lives with wife, 2 level home but lives on 1st level, tub shower, 1 recent fall, does not drive, no DME in the home     OBJECTIVE:     PAIN: VITAL SIGNS: LINES/DRAINS:   Pre Treatment: Pain Screen  Pain Scale 1: Numeric (0 - 10)  Pain Intensity 1: 0  Post Treatment: no change  Vital Signs  O2 Sat (%): 98 %  O2 Device: Hi flow nasal cannula  O2 Flow Rate (L/min): 13 l/min IV  O2 Device: Hi flow nasal cannula     ACTIVITIES OF DAILY LIVING: I Mod I S SBA CGA Min Mod Max Total NT Comments   BASIC ADLs:              Bathing/ Showering [] [] [] [] [] [] [] [] [] [x]    Toileting [] [] [] [] [] [] [] [] [] [x]    Dressing [] [] [] [x] [] [] [] [] [] [] Adjusted socks   Feeding [] [] [] [] [] [] [] [] [] [x]    Grooming [] [] [] [] [x] [] [] [] [] [] Washed face and brushed teeth standing at sink   Personal Device Care [] [] [] [] [] [] [] [] [] []    Functional Mobility [] [] [] [] [x] [] [] [] [] [] Ambulated in room CGA   I=Independent, Mod I=Modified Independent, S=Supervision, SBA=Standby Assistance, CGA=Contact Guard Assistance,   Min=Minimal Assistance, Mod=Moderate Assistance, Max=Maximal Assistance, Total=Total Assistance, NT=Not Tested    MOBILITY: I Mod I S SBA CGA Min Mod Max Total  NT x2 Comments:   Supine to sit [] [] [] [] [] [] [] [] [] [x] [] Received in chair    Sit to supine [] [] [] [] [] [] [] [] [] [x] [] Left in chair    Sit to stand [] [] [] [] [x] [] [] [] [] [] []    Bed to chair [] [] [] [] [] [] [] [] [] [] [] CGA for ambulation in room   I=Independent, Mod I=Modified Independent, S=Supervision, SBA=Standby Assistance, CGA=Contact Guard Assistance,   Min=Minimal Assistance, Mod=Moderate Assistance, Max=Maximal Assistance, Total=Total Assistance, NT=Not Tested    BALANCE: Good Fair+ Fair Fair- Poor NT Comments   Sitting Static [x] [] [] [] [] []    Sitting Dynamic [] [x] [] [] [] []              Standing Static [] [x] [] [] [] []    Standing Dynamic [] [x] [] [] [] []      PLAN: FREQUENCY/DURATION: OT Plan of Care: 3 times/week for duration of hospital stay or until stated goals are met, whichever comes first.    TREATMENT:   TREATMENT:   ($$ Self Care/Home Management: 8-22 mins$$ Therapeutic Activity: 23-37 mins   )  Co-Treatment PT/OT necessary due to patient's decreased overall endurance/tolerance levels, as well as need for high level skilled assistance to complete functional transfers/mobility and functional tasks  Therapeutic Activity (30 Minutes): Therapeutic activity included Transfer Training, Ambulation on level ground, Sitting balance  and Standing balance to improve functional Mobility, Strength and Activity tolerance. Self Care (12 Minutes): Self care including Lower Body Dressing, Grooming and Energy Conservation Training to increase independence and decrease level of assistance required.     TREATMENT GRID:  N/A    AFTER TREATMENT POSITION/PRECAUTIONS:  Chair, Needs within reach and RN notified    INTERDISCIPLINARY COLLABORATION:  RN/PCT and OT/EVANS    TOTAL TREATMENT DURATION:  OT Patient Time In/Time Out  Time In: 0147  Time Out: [facility]

## 2021-09-02 NOTE — PROGRESS NOTES
Problem: Falls - Risk of  Goal: *Absence of Falls  Description: Document Usha Pollard Fall Risk and appropriate interventions in the flowsheet. Outcome: Progressing Towards Goal  Note: Fall Risk Interventions:  Mobility Interventions: Bed/chair exit alarm, Patient to call before getting OOB    Mentation Interventions: Adequate sleep, hydration, pain control    Medication Interventions: Bed/chair exit alarm    Elimination Interventions: Bed/chair exit alarm, Call light in reach    History of Falls Interventions: Bed/chair exit alarm         Problem: Patient Education: Go to Patient Education Activity  Goal: Patient/Family Education  Outcome: Progressing Towards Goal     Problem: Airway Clearance - Ineffective  Goal: Achieve or maintain patent airway  Outcome: Progressing Towards Goal     Problem: Gas Exchange - Impaired  Goal: Absence of hypoxia  Outcome: Progressing Towards Goal  Goal: Promote optimal lung function  Outcome: Progressing Towards Goal     Problem: Breathing Pattern - Ineffective  Goal: Ability to achieve and maintain a regular respiratory rate  Outcome: Progressing Towards Goal     Problem:  Body Temperature -  Risk of, Imbalanced  Goal: Ability to maintain a body temperature within defined limits  Outcome: Progressing Towards Goal  Goal: Will regain or maintain usual level of consciousness  Outcome: Progressing Towards Goal  Goal: Complications related to the disease process, condition or treatment will be avoided or minimized  Outcome: Progressing Towards Goal     Problem: Isolation Precautions - Risk of Spread of Infection  Goal: Prevent transmission of infectious organism to others  Outcome: Progressing Towards Goal     Problem: Nutrition Deficits  Goal: Optimize nutrtional status  Outcome: Progressing Towards Goal     Problem: Risk for Fluid Volume Deficit  Goal: Maintain normal heart rhythm  Outcome: Progressing Towards Goal  Goal: Maintain absence of muscle cramping  Outcome: Progressing Towards Goal  Goal: Maintain normal serum potassium, sodium, calcium, phosphorus, and pH  Outcome: Progressing Towards Goal     Problem: Loneliness or Risk for Loneliness  Goal: Demonstrate positive use of time alone when socialization is not possible  Outcome: Progressing Towards Goal     Problem: Fatigue  Goal: Verbalize increase energy and improved vitality  Outcome: Progressing Towards Goal     Problem: Patient Education: Go to Patient Education Activity  Goal: Patient/Family Education  Outcome: Progressing Towards Goal     Problem: Patient Education: Go to Patient Education Activity  Goal: Patient/Family Education  Outcome: Progressing Towards Goal     Problem: Pneumonia: Day 1  Goal: Off Pathway (Use only if patient is Off Pathway)  Outcome: Progressing Towards Goal  Goal: Activity/Safety  Outcome: Progressing Towards Goal  Goal: Consults, if ordered  Outcome: Progressing Towards Goal  Goal: Diagnostic Test/Procedures  Outcome: Progressing Towards Goal  Goal: Nutrition/Diet  Outcome: Progressing Towards Goal  Goal: Medications  Outcome: Progressing Towards Goal  Goal: Respiratory  Outcome: Progressing Towards Goal  Goal: Treatments/Interventions/Procedures  Outcome: Progressing Towards Goal  Goal: Psychosocial  Outcome: Progressing Towards Goal  Goal: *Oxygen saturation within defined limits  Outcome: Progressing Towards Goal  Goal: *Influenza vaccine administered (October-March)  Outcome: Progressing Towards Goal  Goal: *Pneumoccocal vaccine administered  Outcome: Progressing Towards Goal  Goal: *Hemodynamically stable  Outcome: Progressing Towards Goal  Goal: *Demonstrates progressive activity  Outcome: Progressing Towards Goal  Goal: *Tolerating diet  Outcome: Progressing Towards Goal     Problem: Pneumonia: Day 2  Goal: Off Pathway (Use only if patient is Off Pathway)  Outcome: Progressing Towards Goal  Goal: Activity/Safety  Outcome: Progressing Towards Goal  Goal: Consults, if ordered  Outcome: Progressing Towards Goal  Goal: Diagnostic Test/Procedures  Outcome: Progressing Towards Goal  Goal: Nutrition/Diet  Outcome: Progressing Towards Goal  Goal: Discharge Planning  Outcome: Progressing Towards Goal  Goal: Medications  Outcome: Progressing Towards Goal  Goal: Respiratory  Outcome: Progressing Towards Goal  Goal: Treatments/Interventions/Procedures  Outcome: Progressing Towards Goal  Goal: Psychosocial  Outcome: Progressing Towards Goal  Goal: *Oxygen saturation within defined limits  Outcome: Progressing Towards Goal  Goal: *Hemodynamically stable  Outcome: Progressing Towards Goal  Goal: *Demonstrates progressive activity  Outcome: Progressing Towards Goal  Goal: *Tolerating diet  Outcome: Progressing Towards Goal  Goal: *Optimal pain control at patient's stated goal  Outcome: Progressing Towards Goal     Problem: Pneumonia: Day 3  Goal: Off Pathway (Use only if patient is Off Pathway)  Outcome: Progressing Towards Goal  Goal: Activity/Safety  Outcome: Progressing Towards Goal  Goal: Consults, if ordered  Outcome: Progressing Towards Goal  Goal: Diagnostic Test/Procedures  Outcome: Progressing Towards Goal  Goal: Nutrition/Diet  Outcome: Progressing Towards Goal  Goal: Discharge Planning  Outcome: Progressing Towards Goal  Goal: Medications  Outcome: Progressing Towards Goal  Goal: Respiratory  Outcome: Progressing Towards Goal  Goal: Treatments/Interventions/Procedures  Outcome: Progressing Towards Goal  Goal: Psychosocial  Outcome: Progressing Towards Goal  Goal: *Oxygen saturation within defined limits  Outcome: Progressing Towards Goal  Goal: *Hemodynamically stable  Outcome: Progressing Towards Goal  Goal: *Demonstrates progressive activity  Outcome: Progressing Towards Goal  Goal: *Tolerating diet  Outcome: Progressing Towards Goal  Goal: *Describes available resources and support systems  Outcome: Progressing Towards Goal  Goal: *Optimal pain control at patient's stated goal  Outcome: Progressing Towards Goal     Problem: Pneumonia: Day 4  Goal: Off Pathway (Use only if patient is Off Pathway)  Outcome: Progressing Towards Goal  Goal: Activity/Safety  Outcome: Progressing Towards Goal  Goal: Nutrition/Diet  Outcome: Progressing Towards Goal  Goal: Discharge Planning  Outcome: Progressing Towards Goal  Goal: Medications  Outcome: Progressing Towards Goal  Goal: Respiratory  Outcome: Progressing Towards Goal  Goal: Treatments/Interventions/Procedures  Outcome: Progressing Towards Goal  Goal: Psychosocial  Outcome: Progressing Towards Goal     Problem: Pneumonia: Discharge Outcomes  Goal: *Demonstrates progressive activity  Outcome: Progressing Towards Goal  Goal: *Describes follow-up/return visits to physicians  Outcome: Progressing Towards Goal  Goal: *Tolerating diet  Outcome: Progressing Towards Goal  Goal: *Verbalizes name, dosage, time, side effects, and number of days to continue medications  Outcome: Progressing Towards Goal  Goal: *Influenza immunization  Outcome: Progressing Towards Goal  Goal: *Pneumococcal immunization  Outcome: Progressing Towards Goal  Goal: *Respiratory status at baseline  Outcome: Progressing Towards Goal  Goal: *Vital signs within defined limits  Outcome: Progressing Towards Goal  Goal: *Describes available resources and support systems  Outcome: Progressing Towards Goal  Goal: *Optimal pain control at patient's stated goal  Outcome: Progressing Towards Goal

## 2021-09-02 NOTE — PROGRESS NOTES
Hourly rounds performed during shift, pt slept most of shift and interruptions, awakening pt kept at a minimum to maximize pt rest. Pt denies any pain or needs at this time. Bed in low and locked position, call light and personal items within reach. Pt reminded to call for help prior to getting OOB, pt verbalizes understanding. Pt currently on 12L HFNC and can pull 500 on IS. Pt encouraged to use IS more frequently and set goal of reaching 1000ml. Will continue to monitor and give bedside report to oncoming shift nurse.

## 2021-09-02 NOTE — DIABETES MGMT
Patient admitted with pneumonia due to COVID-19 vrius. Blood glucose ranged 201-296 yesterday with patient receiving Lantus 45 units, Humalog 54 units, and Decadron 18mg. Blood glucose this morning was 119. Glycemic control improving. Reviewed patient current regimen: Lantus 45 units nightly, Humalog 16 units TID with meals, and Humalog correctional insulin. Per chart review patient currently on hi flow nasal cannula 12 l/min.

## 2021-09-02 NOTE — PROGRESS NOTES
Problem: Falls - Risk of  Goal: *Absence of Falls  Description: Document Belgica Larsen Fall Risk and appropriate interventions in the flowsheet. Outcome: Progressing Towards Goal  Note: Fall Risk Interventions:  Mobility Interventions: Bed/chair exit alarm, Patient to call before getting OOB    Mentation Interventions: Adequate sleep, hydration, pain control    Medication Interventions: Bed/chair exit alarm, Patient to call before getting OOB    Elimination Interventions: Bed/chair exit alarm, Call light in reach, Patient to call for help with toileting needs    History of Falls Interventions: Bed/chair exit alarm         Problem: Patient Education: Go to Patient Education Activity  Goal: Patient/Family Education  Outcome: Progressing Towards Goal     Problem: Airway Clearance - Ineffective  Goal: Achieve or maintain patent airway  Outcome: Progressing Towards Goal     Problem: Gas Exchange - Impaired  Goal: Absence of hypoxia  Outcome: Progressing Towards Goal  Goal: Promote optimal lung function  Outcome: Progressing Towards Goal     Problem: Breathing Pattern - Ineffective  Goal: Ability to achieve and maintain a regular respiratory rate  Outcome: Progressing Towards Goal     Problem:  Body Temperature -  Risk of, Imbalanced  Goal: Ability to maintain a body temperature within defined limits  Outcome: Progressing Towards Goal  Goal: Will regain or maintain usual level of consciousness  Outcome: Progressing Towards Goal  Goal: Complications related to the disease process, condition or treatment will be avoided or minimized  Outcome: Progressing Towards Goal     Problem: Isolation Precautions - Risk of Spread of Infection  Goal: Prevent transmission of infectious organism to others  Outcome: Progressing Towards Goal     Problem: Nutrition Deficits  Goal: Optimize nutrtional status  Outcome: Progressing Towards Goal     Problem: Risk for Fluid Volume Deficit  Goal: Maintain normal heart rhythm  Outcome: Progressing Towards Goal  Goal: Maintain absence of muscle cramping  Outcome: Progressing Towards Goal  Goal: Maintain normal serum potassium, sodium, calcium, phosphorus, and pH  Outcome: Progressing Towards Goal     Problem: Loneliness or Risk for Loneliness  Goal: Demonstrate positive use of time alone when socialization is not possible  Outcome: Progressing Towards Goal     Problem: Fatigue  Goal: Verbalize increase energy and improved vitality  Outcome: Progressing Towards Goal     Problem: Patient Education: Go to Patient Education Activity  Goal: Patient/Family Education  Outcome: Progressing Towards Goal     Problem: Patient Education: Go to Patient Education Activity  Goal: Patient/Family Education  Outcome: Progressing Towards Goal     Problem: Pneumonia: Day 1  Goal: Off Pathway (Use only if patient is Off Pathway)  Outcome: Progressing Towards Goal  Goal: Activity/Safety  Outcome: Progressing Towards Goal  Goal: Consults, if ordered  Outcome: Progressing Towards Goal  Goal: Diagnostic Test/Procedures  Outcome: Progressing Towards Goal  Goal: Nutrition/Diet  Outcome: Progressing Towards Goal  Goal: Medications  Outcome: Progressing Towards Goal  Goal: Respiratory  Outcome: Progressing Towards Goal  Goal: Treatments/Interventions/Procedures  Outcome: Progressing Towards Goal  Goal: Psychosocial  Outcome: Progressing Towards Goal  Goal: *Oxygen saturation within defined limits  Outcome: Progressing Towards Goal  Goal: *Influenza vaccine administered (October-March)  Outcome: Progressing Towards Goal  Goal: *Pneumoccocal vaccine administered  Outcome: Progressing Towards Goal  Goal: *Hemodynamically stable  Outcome: Progressing Towards Goal  Goal: *Demonstrates progressive activity  Outcome: Progressing Towards Goal  Goal: *Tolerating diet  Outcome: Progressing Towards Goal     Problem: Pneumonia: Day 2  Goal: Off Pathway (Use only if patient is Off Pathway)  Outcome: Progressing Towards Goal  Goal: Activity/Safety  Outcome: Progressing Towards Goal  Goal: Consults, if ordered  Outcome: Progressing Towards Goal  Goal: Diagnostic Test/Procedures  Outcome: Progressing Towards Goal  Goal: Nutrition/Diet  Outcome: Progressing Towards Goal  Goal: Discharge Planning  Outcome: Progressing Towards Goal  Goal: Medications  Outcome: Progressing Towards Goal  Goal: Respiratory  Outcome: Progressing Towards Goal  Goal: Treatments/Interventions/Procedures  Outcome: Progressing Towards Goal  Goal: Psychosocial  Outcome: Progressing Towards Goal  Goal: *Oxygen saturation within defined limits  Outcome: Progressing Towards Goal  Goal: *Hemodynamically stable  Outcome: Progressing Towards Goal  Goal: *Demonstrates progressive activity  Outcome: Progressing Towards Goal  Goal: *Tolerating diet  Outcome: Progressing Towards Goal  Goal: *Optimal pain control at patient's stated goal  Outcome: Progressing Towards Goal     Problem: Pneumonia: Day 3  Goal: Off Pathway (Use only if patient is Off Pathway)  Outcome: Progressing Towards Goal  Goal: Activity/Safety  Outcome: Progressing Towards Goal  Goal: Consults, if ordered  Outcome: Progressing Towards Goal  Goal: Diagnostic Test/Procedures  Outcome: Progressing Towards Goal  Goal: Nutrition/Diet  Outcome: Progressing Towards Goal  Goal: Discharge Planning  Outcome: Progressing Towards Goal  Goal: Medications  Outcome: Progressing Towards Goal  Goal: Respiratory  Outcome: Progressing Towards Goal  Goal: Treatments/Interventions/Procedures  Outcome: Progressing Towards Goal  Goal: Psychosocial  Outcome: Progressing Towards Goal  Goal: *Oxygen saturation within defined limits  Outcome: Progressing Towards Goal  Goal: *Hemodynamically stable  Outcome: Progressing Towards Goal  Goal: *Demonstrates progressive activity  Outcome: Progressing Towards Goal  Goal: *Tolerating diet  Outcome: Progressing Towards Goal  Goal: *Describes available resources and support systems  Outcome: Progressing Towards Goal  Goal: *Optimal pain control at patient's stated goal  Outcome: Progressing Towards Goal     Problem: Pneumonia: Day 4  Goal: Off Pathway (Use only if patient is Off Pathway)  Outcome: Progressing Towards Goal  Goal: Activity/Safety  Outcome: Progressing Towards Goal  Goal: Nutrition/Diet  Outcome: Progressing Towards Goal  Goal: Discharge Planning  Outcome: Progressing Towards Goal  Goal: Medications  Outcome: Progressing Towards Goal  Goal: Respiratory  Outcome: Progressing Towards Goal  Goal: Treatments/Interventions/Procedures  Outcome: Progressing Towards Goal  Goal: Psychosocial  Outcome: Progressing Towards Goal     Problem: Pneumonia: Discharge Outcomes  Goal: *Demonstrates progressive activity  Outcome: Progressing Towards Goal  Goal: *Describes follow-up/return visits to physicians  Outcome: Progressing Towards Goal  Goal: *Tolerating diet  Outcome: Progressing Towards Goal  Goal: *Verbalizes name, dosage, time, side effects, and number of days to continue medications  Outcome: Progressing Towards Goal  Goal: *Influenza immunization  Outcome: Progressing Towards Goal  Goal: *Pneumococcal immunization  Outcome: Progressing Towards Goal  Goal: *Respiratory status at baseline  Outcome: Progressing Towards Goal  Goal: *Vital signs within defined limits  Outcome: Progressing Towards Goal  Goal: *Describes available resources and support systems  Outcome: Progressing Towards Goal  Goal: *Optimal pain control at patient's stated goal  Outcome: Progressing Towards Goal

## 2021-09-02 NOTE — PROGRESS NOTES
CM chart review. Patient currently requiring 12L HFNC. OT recommending [facility] at discharge. CM spoke with patient by phone. He is agreeable to [facility] and has no preference of agency. Referral placed to [facility] for RN/PT/OT services. CM will follow to assist with new needs that may arise. Update 1600: CM notified by [facility] liaison that PCP listed is actually an urgent care physician. Patient will need to be established with a PCP to follow [facility] orders. CM spoke with patient by phone due to [facility] isolation. Patient is agreeable to referral to [facility] for assistance locating new PCP. Referral placed to [facility] with notification that patient will need follow-up within 5 days of discharge for home health purposes. Patient verbalized understanding that he will be contacted within 48 hrs of discharge with new PCP information. [facility] updated with the above.

## 2021-09-02 NOTE — PROGRESS NOTES
ACUTE PHYSICAL THERAPY GOALS:  (Developed with and agreed upon by patient and/or caregiver. )  LT. Pt will be able to perform bed mobility and transfers with independence in order to be able to safely function within their home within 7 treatment days. 2. Pt will be able to ambulate a minimum of 250 ft with independence and use of least restrictive device in order to return to home and community ambulation within 7 treatment days. 3. Pt will be able to ambulate up/down 2 stairs with supervision in order to access his/her home safely within 7 treatment days. 4. Pt will demonstrate normal standing and sitting balance with independence in order to safely perform functional mobility, ADLS and decrease fall risk within 7 treatment days. PHYSICAL THERAPY: Daily Note and AM Treatment Day # 2    Mildred Dejesus is a 69 y.o. male   PRIMARY DIAGNOSIS: Pneumonia due to COVID-19 virus  Pneumonia due to COVID-19 virus [U07.1, J12.82]  Hypertension [I10]         ASSESSMENT:     REHAB RECOMMENDATIONS: CURRENT LEVEL OF FUNCTION:  (Most Recently Demonstrated)   Recommendation to date pending progress:  [address] [place] [address] vs no needs  Equipment:    To Be Determined Bed Mobility:   Not tested  Sit to Stand:   Standby Assistance  Transfers:   Standby Assistance  Gait/Mobility:   Standby Assistance     ASSESSMENT:  Mr. Mary Gomez is up in the chair, wanting to go home. He maintained his sats 92-98% throughout session on 13L HFNC. He ambulated in room 40' and into the bathroom where he stood several minutes and then back to the chair. He is making gradual progress towards goals. He seems quite down about being here without any family. Will continue with POC. SUBJECTIVE:   Mr. Mary Gomez states, \"I just want to go home. \"    SOCIAL HISTORY/ LIVING ENVIRONMENT: Pt lives in a 2 story home with his wife, he resides on the main floor, one significant fall right before admission, no AD use, independent with all ADLs and transfers at baseline     OBJECTIVE:     PAIN: VITAL SIGNS: LINES/DRAINS:   Pre Treatment:  0  Post Treatment: 0   noen  O2 Device: Hi flow nasal cannula     MOBILITY: I Mod I S SBA CGA Min Mod Max Total  NT x2 Comments:   Bed Mobility    Rolling [] [] [] [] [] [] [] [] [] [] []    Supine to Sit [] [] [] [] [] [] [] [] [] [] []    Scooting [] [] [] [] [] [] [] [] [] [] []    Sit to Supine [] [] [] [] [] [] [] [] [] [] []    Transfers    Sit to Stand [] [] [] [x] [] [] [] [] [] [] [] Standing tolerance 3-4 minutes   Bed to Chair [] [] [] [x] [] [] [] [] [] [] []    Stand to Sit [] [] [] [x] [] [] [] [] [] [] []    I=Independent, Mod I=Modified Independent, S=Supervision, SBA=Standby Assistance, CGA=Contact Guard Assistance,   Min=Minimal Assistance, Mod=Moderate Assistance, Max=Maximal Assistance, Total=Total Assistance, NT=Not Tested    BALANCE: Good Fair+ Fair Fair- Poor NT Comments   Sitting Static [x] [] [] [] [] []    Sitting Dynamic [x] [] [] [] [] []              Standing Static [x] [] [] [] [] []    Standing Dynamic [] [x] [] [] [] []      GAIT: I Mod I S SBA CGA Min Mod Max Total  NT x2 Comments:   Level of Assistance [] [] [] [x] [] [] [] [] [] [] []    Distance 40', 12' x 2    DME None    Gait Quality Short step length    Weightbearing  Status N/A     I=Independent, Mod I=Modified Independent, S=Supervision, SBA=Standby Assistance, CGA=Contact Guard Assistance,   Min=Minimal Assistance, Mod=Moderate Assistance, Max=Maximal Assistance, Total=Total Assistance, NT=Not Tested    PLAN:   FREQUENCY/DURATION: PT Plan of Care: 3 times/week for duration of hospital stay or until stated goals are met, whichever comes first.  TREATMENT:     TREATMENT:   ($$ Gait Trainin-52 mins    )  Co-Treatment PT/OT necessary due to patient's decreased overall endurance/tolerance levels, as well as need for high level skilled assistance to complete functional transfers/mobility and functional tasks  Gait Training (40 Minutes): Gait training for 40', 12' x 2 feet utilizing None. Patient required Manual, Tactile and Verbal cueing to improve Activity Pacing, Dynamic Standing Balance and Gait Mechanics.      TREATMENT GRID:  N/A    AFTER TREATMENT POSITION/PRECAUTIONS:  Chair, Needs within reach and RN notified    INTERDISCIPLINARY COLLABORATION:  RN/PCT, PT/PTA and OT/EVANS    TOTAL TREATMENT DURATION:  PT Patient Time In/Time Out  Time In: 0920  Time Out: 10 Melvi Carlos, PTA

## 2021-09-02 NOTE — PROGRESS NOTES
Hospitalist Progress Note   Admit Date:  2021  9:30 PM   Name:  Jo Ann Donahue   Age:  69 y.o. Sex:  male  :  1950   MRN:  [id number]     Presenting Complaint: Shortness of Breath    Reason(s) for Admission: Pneumonia due to COVID-19 virus [U07.1, J12.82]  Hypertension [I10]     Hospital Course & Interval History:   69year old CM PMH DM, HTN has no PCP admitted  for acute respiratory failure with hypoxia. He was dx with COVID 19  at outpatient. Per EMS, he was noted with saturations mid 80s, does not require oxygen at baseline. He has had 1 vaccine with 2nd due next week. He was started on remdesivir, dexamethasone, rocephin and doxy. Chest CT  with COVID pneumonia and small effusion, received lasix. Subjective (21):  \"I just want to go home. \"  Morbidly obese 70y. o. male sitting up in chair, O2 requirements down from 15L to 13L NC. C/o generalized malaise and upset that he can't get a good night's sleep in hospital but denies insomnia. He further denies CP, n/v/d, abd pain. Review of Systems:  10 systems reviewed and negative except as noted in HPI. Assessment & Plan:     Acute respiratory failure with hypoxia related to COVID 19 infection:  Dx , requiring oxygen which is new for him   procal 2.01, ABT will continue. leigionella and mycoplasma, strep pending. Day 2 of Remdesivir  Encourage IS use, deep breathing   requiring higher oxygen use, now on 12 liters with sats mid 90s. CT chest COVID PNA, right small pleural effusion. Increasing Dexamethasone to 6 mg Q8h  Lasix x 1 IV  Check BNP  Day 3 of Remdesivir  Day 4 Rocephin, Day 3 Doxycycline  COVID labs improving to include Procal reduction.  Rocephin EOT today. Doxy EOT . Continue steroids. remdesivir EOT . Increase in O2 demands today to 14 L.      up to 15 liters. Repeat CRP down, ddimer slightly up.     2021 tolerated wean from 15L to 13L; cont weaning as tolerated; cxray from 9/1/2021 with interval improvement b/l lung fields     2. DM2:  A1C on 8/24 from doctor office noted 12  SSI   Adding Lantus insulin 15 units nightly. *Patient on Dexamethasone so expect increase in BS  8/29 increasing Lantus to 20 units nightly, diabetic educator consult placed. 8/30 remains elevated with steroid use, adding 3 units premeal and increasing Lantus to 30 units nightly. 8/31 increase prandial insulin to humalog 6 units with meals  9/1 increase lantus to 45 units, increase prandial to 16 units with meals  9/2/2021 increased pre-meal insulin dose; cont lantus 45U qhs as his glucose in AM lab draw acceptable range     3. Essential HTN:  Well controlled, not on any medications.       4. Thrombocytopenia:  Noted 26  On 8/24 noted 98  Likely related to [place] 19 infection  Patient states having a H/O same over 10 years ago  Trend for now, transfuse if less than 10  8/29 now 67, pathologist review smear pending  8/30 slowly improving now 80, pathologist review still pending  8/31 pathology smear. Normocytic/normochromic, adequate platelets  3/7/8222 platelet count ~stable; h/h stable no gross bleeding; monitor     5.  Small right pleural effusion:  Noted on CT chest 8/30  Check BNP  Give Lasix 40 mg IV x 1  I&O   9/2/2021 improved cxray from 9/2/2021; no further needs for diuretic tx        Diet:  DIET ADULT ORAL NUTRITION SUPPLEMENT  DIET ADULT  DVT PPx: SCD given thrombocytopenia  Code status: Full Code  Disposition/Expected LOS: 1-2 days; anticipate home when medically stable for [address] Problems    Diagnosis Date Noted    DM (diabetes mellitus) ([facility] 08/28/2021    Hypertension 08/27/2021    Pneumonia due to COVID-19 virus 08/27/2021    Thrombocytopenia ([facility] 08/27/2021    Acute respiratory failure with hypoxia ([facility] 08/27/2021       Objective:     Patient Vitals for the past 24 hrs:   Temp Pulse Resp BP SpO2   09/02/21 1448 97.5 °F (36.4 °C) 86 24 136/81 100 %   09/02/21 1417     96 %   09/02/21 1054 (!) 96.7 °F (35.9 °C) 87 24 125/73 100 %   09/02/21 0918     98 %   09/02/21 0716 (!) 96.4 °F (35.8 °C) 65 24 134/81 95 %   09/02/21 0503  72 20 (!) 144/84 90 %   09/02/21 0006 97.8 °F (36.6 °C) 66 20 135/75 91 %   09/01/21 1918 97.6 °F (36.4 °C) 78 20 134/83 100 %   09/01/21 1623 97.7 °F (36.5 °C) 73 18 138/79 100 %     Oxygen Therapy  O2 Sat (%): 100 % (09/02/21 1448)  Pulse via Oximetry: 66 beats per minute (09/02/21 1417)  O2 Device: Hi flow nasal cannula (09/02/21 1417)  Skin Assessment: Clean, dry, & intact (09/02/21 1417)  Skin Protection for O2 Device: No (08/31/21 0427)  O2 Flow Rate (L/min): 13 l/min (09/02/21 1417)    Estimated body mass index is 35.83 kg/m² as calculated from the following:    Height as of this encounter: 5' 6\" (1.676 m). Weight as of this encounter: 100.7 kg (222 lb 0.1 oz). Intake/Output Summary (Last 24 hours) at 9/2/2021 1513  Last data filed at 9/2/2021 1054  Gross per 24 hour   Intake 120 ml   Output    Net 120 ml         Physical Exam:     General:          Morbidly obese, no acute distress, speaking in full sentences, no use of accessory muscles   Lungs:             Diminished lower lobes bilat with crackles b/l bases; no wheezing / rhonchi  CV:                  Regular rate and rhythm with normal S1 and S2. No murmurs rubs gallops.      Abdomen:       Soft, nontender, nondistended, normoactive bowel sounds   Extremities:    No cyanosis clubbing; mild b/l LE dependent edema  Neuro:             Nonfocal, A&O x3   Psych:             Normal affect       I have reviewed ordered lab tests and independently visualized imaging below:    Last 24hr Labs:  Recent Results (from the past 24 hour(s))   GLUCOSE, POC    Collection Time: 09/01/21  4:25 PM   Result Value Ref Range    Glucose (POC) 239 (H) 65 - 100 mg/dL    Performed by [facility]    GLUCOSE, POC    Collection Time: 09/01/21  8:41 PM   Result Value Ref Range    Glucose (POC) 212 (H) 65 - 100 mg/dL    Performed by [facility]    METABOLIC PANEL, COMPREHENSIVE    Collection Time: 09/02/21  5:31 AM   Result Value Ref Range    Sodium 140 138 - 145 mmol/L    Potassium 4.2 3.5 - 5.1 mmol/L    Chloride 108 (H) 98 - 107 mmol/L    CO2 29 21 - 32 mmol/L    Anion gap 3 (L) 7 - 16 mmol/L    Glucose 117 (H) 65 - 100 mg/dL    BUN 21 8 - 23 MG/DL    Creatinine 0.57 (L) 0.8 - 1.5 MG/DL    GFR est AA >60 >60 ml/min/1.73m2    GFR est non-AA >60 >60 ml/min/1.73m2    Calcium 8.9 8.3 - 10.4 MG/DL    Bilirubin, total 0.5 0.2 - 1.1 MG/DL    ALT (SGPT) 32 12 - 65 U/L    AST (SGOT) 21 15 - 37 U/L    Alk. phosphatase 63 50 - 136 U/L    Protein, total 5.9 (L) 6.3 - 8.2 g/dL    Albumin 2.2 (L) 3.2 - 4.6 g/dL    Globulin 3.7 (H) 2.3 - 3.5 g/dL    A-G Ratio 0.6 (L) 1.2 - 3.5     CRP, HIGH SENSITIVITY    Collection Time: 09/02/21  5:31 AM   Result Value Ref Range    CRP, High sensitivity 41.3 mg/L   D DIMER    Collection Time: 09/02/21  5:31 AM   Result Value Ref Range    D DIMER 5.73 (H) <0.56 ug/ml(FEU)   GLUCOSE, POC    Collection Time: 09/02/21  7:14 AM   Result Value Ref Range    Glucose (POC) 119 (H) 65 - 100 mg/dL    Performed by [facility]    CBC WITH AUTOMATED DIFF    Collection Time: 09/02/21  8:10 AM   Result Value Ref Range    WBC 14.4 (H) 4.3 - 11.1 K/uL    RBC 4.92 4.23 - 5.6 M/uL    HGB 14.6 13.6 - 17.2 g/dL    HCT 43.1 41.1 - 50.3 %    MCV 87.6 79.6 - 97.8 FL    MCH 29.7 26.1 - 32.9 PG    MCHC 33.9 31.4 - 35.0 g/dL    RDW 12.9 11.9 - 14.6 %    PLATELET 89 (L) 067 - 450 K/uL    MPV 11.4 9.4 - 12.3 FL    ABSOLUTE NRBC 0.00 0.0 - 0.2 K/uL    NEUTROPHILS 92 (H) 43 - 78 %    LYMPHOCYTES 4 (L) 13 - 44 %    MONOCYTES 3 (L) 4.0 - 12.0 %    EOSINOPHILS 0 (L) 0.5 - 7.8 %    BASOPHILS 0 0.0 - 2.0 %    IMMATURE GRANULOCYTES 1 0.0 - 5.0 %    ABS. NEUTROPHILS 13.3 (H) 1.7 - 8.2 K/UL    ABS. LYMPHOCYTES 0.6 0.5 - 4.6 K/UL    ABS. MONOCYTES 0.4 0.1 - 1.3 K/UL    ABS. EOSINOPHILS 0.0 0.0 - 0.8 K/UL    ABS.  BASOPHILS 0.0 0.0 - 0.2 K/UL    ABS. IMM. GRANS. 0.1 0.0 - 0.5 K/UL    RBC COMMENTS NORMOCYTIC/NORMOCHROMIC      WBC COMMENTS Result Confirmed By Smear      PLATELET COMMENTS       NUMEROUS PLATELET AGGREGATES PRESENT ON SMEAR, SUGGEST ALL FUTURE CBC'S BE COLLECTED AND ANALYZED ON BLUT TOP TUBES ( NA CITRATE )    DF AUTOMATED     HEMOGLOBIN A1C WITH EAG    Collection Time: 09/02/21  8:10 AM   Result Value Ref Range    Hemoglobin A1c 12.2 (H) 4.2 - 6.3 %    Est. average glucose 303 mg/dL   GLUCOSE, POC    Collection Time: 09/02/21 11:47 AM   Result Value Ref Range    Glucose (POC) 304 (H) 65 - 100 mg/dL    Performed by [facility]        All Micro Results     Procedure Component Value Units Date/Time    MYCOPLASMA AB, IGM [[id number]] Collected: 08/28/21 5697    Order Status: Completed Specimen: Serum Updated: 08/31/21 0135     M. pneumoniae Ab, IgM <770 U/mL      Comment: (NOTE)                              Negative            <770  Clinically significant amount of M. pneumoniae antibody  not detected. Low Positive   770 - 12  M. pneumoniae specific IgM presumptively detected. It  is recommended that another sample be collected 1-2  weeks later to assure reactivity. Positive            >950  Highly significant amount of M. pneumoniae specific  IgM antibody detected. Performed At: [address] [id number]  Kristin Jade MD DB:[id number]         Juarez Clineh [[id number]] Collected: 08/27/21 2330    Order Status: Canceled Specimen: Urine     S. Rebecca Ramp, UR/CSF [[id number]] Collected: 08/27/21 2330    Order Status: Shayan Dad AB, IGM [[id number]] Collected: 08/27/21 2325    Order Status: Canceled Specimen: Serum           Other Studies:  No results found.     Current Meds:  Current Facility-Administered Medications   Medication Dose Route Frequency    insulin lispro (HUMALOG) injection 16 Units  16 Units SubCUTAneous TIDAC    insulin glargine (LANTUS) injection 45 Units  45 Units SubCUTAneous QHS    sodium chloride (OCEAN) 0.65 % nasal squeeze bottle 2 Spray  2 Spray Both Nostrils PRN    dexAMETHasone (DECADRON) tablet 6 mg  6 mg Oral Q8H    melatonin tablet 5 mg  5 mg Oral QHS    insulin lispro (HUMALOG) injection   SubCUTAneous AC&HS    sodium chloride (NS) flush 5-40 mL  5-40 mL IntraVENous Q8H    sodium chloride (NS) flush 5-40 mL  5-40 mL IntraVENous PRN    acetaminophen (TYLENOL) tablet 650 mg  650 mg Oral Q6H PRN    Or    acetaminophen (TYLENOL) suppository 650 mg  650 mg Rectal Q6H PRN    polyethylene glycol (MIRALAX) packet 17 g  17 g Oral DAILY PRN    ondansetron (ZOFRAN ODT) tablet 4 mg  4 mg Oral Q8H PRN    Or    ondansetron (ZOFRAN) injection 4 mg  4 mg IntraVENous Q6H PRN       Signed:  KYLAH Villegas

## 2021-09-03 LAB
ALBUMIN SERPL-MCNC: 2.1 G/DL (ref 3.2–4.6)
ALBUMIN/GLOB SERPL: 0.6 {RATIO} (ref 1.2–3.5)
ALP SERPL-CCNC: 66 U/L (ref 50–136)
ALT SERPL-CCNC: 46 U/L (ref 12–65)
ANION GAP SERPL CALC-SCNC: 3 MMOL/L (ref 7–16)
AST SERPL-CCNC: 28 U/L (ref 15–37)
BASOPHILS # BLD: 0 K/UL (ref 0–0.2)
BASOPHILS NFR BLD: 0 % (ref 0–2)
BILIRUB SERPL-MCNC: 0.7 MG/DL (ref 0.2–1.1)
BUN SERPL-MCNC: 20 MG/DL (ref 8–23)
CALCIUM SERPL-MCNC: 8.9 MG/DL (ref 8.3–10.4)
CHLORIDE SERPL-SCNC: 109 MMOL/L (ref 98–107)
CO2 SERPL-SCNC: 30 MMOL/L (ref 21–32)
CREAT SERPL-MCNC: 0.62 MG/DL (ref 0.8–1.5)
DIFFERENTIAL METHOD BLD: ABNORMAL
EOSINOPHIL # BLD: 0 K/UL (ref 0–0.8)
EOSINOPHIL NFR BLD: 0 % (ref 0.5–7.8)
ERYTHROCYTE [DISTWIDTH] IN BLOOD BY AUTOMATED COUNT: 12.9 % (ref 11.9–14.6)
GLOBULIN SER CALC-MCNC: 3.7 G/DL (ref 2.3–3.5)
GLUCOSE BLD STRIP.AUTO-MCNC: 140 MG/DL (ref 65–100)
GLUCOSE BLD STRIP.AUTO-MCNC: 219 MG/DL (ref 65–100)
GLUCOSE BLD STRIP.AUTO-MCNC: 240 MG/DL (ref 65–100)
GLUCOSE BLD STRIP.AUTO-MCNC: 98 MG/DL (ref 65–100)
GLUCOSE SERPL-MCNC: 82 MG/DL (ref 65–100)
HCT VFR BLD AUTO: 43.5 % (ref 41.1–50.3)
HGB BLD-MCNC: 14.5 G/DL (ref 13.6–17.2)
IMM GRANULOCYTES # BLD AUTO: 0.1 K/UL (ref 0–0.5)
IMM GRANULOCYTES NFR BLD AUTO: 1 % (ref 0–5)
LYMPHOCYTES # BLD: 0.7 K/UL (ref 0.5–4.6)
LYMPHOCYTES NFR BLD: 5 % (ref 13–44)
MAGNESIUM SERPL-MCNC: 2.3 MG/DL (ref 1.8–2.4)
MCH RBC QN AUTO: 29.4 PG (ref 26.1–32.9)
MCHC RBC AUTO-ENTMCNC: 33.3 G/DL (ref 31.4–35)
MCV RBC AUTO: 88.2 FL (ref 79.6–97.8)
MONOCYTES # BLD: 0.5 K/UL (ref 0.1–1.3)
MONOCYTES NFR BLD: 4 % (ref 4–12)
NEUTS SEG # BLD: 12.2 K/UL (ref 1.7–8.2)
NEUTS SEG NFR BLD: 90 % (ref 43–78)
NRBC # BLD: 0 K/UL (ref 0–0.2)
PLATELET # BLD AUTO: 111 K/UL (ref 150–450)
PLATELET COMMENTS,PCOM: ABNORMAL
PMV BLD AUTO: 10.5 FL (ref 9.4–12.3)
POTASSIUM SERPL-SCNC: 4.6 MMOL/L (ref 3.5–5.1)
PROT SERPL-MCNC: 5.8 G/DL (ref 6.3–8.2)
RBC # BLD AUTO: 4.93 M/UL (ref 4.23–5.6)
RBC MORPH BLD: ABNORMAL
SERVICE CMNT-IMP: ABNORMAL
SERVICE CMNT-IMP: NORMAL
SODIUM SERPL-SCNC: 142 MMOL/L (ref 138–145)
WBC # BLD AUTO: 13.5 K/UL (ref 4.3–11.1)
WBC MORPH BLD: ABNORMAL

## 2021-09-03 PROCEDURE — 82962 GLUCOSE BLOOD TEST: CPT

## 2021-09-03 PROCEDURE — 97535 SELF CARE MNGMENT TRAINING: CPT

## 2021-09-03 PROCEDURE — 74011250636 HC RX REV CODE- 250/636: Performed by: NURSE PRACTITIONER

## 2021-09-03 PROCEDURE — 74011250637 HC RX REV CODE- 250/637: Performed by: EMERGENCY MEDICINE

## 2021-09-03 PROCEDURE — 74011636637 HC RX REV CODE- 636/637: Performed by: NURSE PRACTITIONER

## 2021-09-03 PROCEDURE — 36415 COLL VENOUS BLD VENIPUNCTURE: CPT

## 2021-09-03 PROCEDURE — 94760 N-INVAS EAR/PLS OXIMETRY 1: CPT

## 2021-09-03 PROCEDURE — 77010033711 HC HIGH FLOW OXYGEN

## 2021-09-03 PROCEDURE — 80053 COMPREHEN METABOLIC PANEL: CPT

## 2021-09-03 PROCEDURE — 83735 ASSAY OF MAGNESIUM: CPT

## 2021-09-03 PROCEDURE — 74011636637 HC RX REV CODE- 636/637: Performed by: PHYSICIAN ASSISTANT

## 2021-09-03 PROCEDURE — 65270000029 HC RM PRIVATE

## 2021-09-03 PROCEDURE — 85025 COMPLETE CBC W/AUTO DIFF WBC: CPT

## 2021-09-03 PROCEDURE — 97530 THERAPEUTIC ACTIVITIES: CPT

## 2021-09-03 RX ORDER — INSULIN GLARGINE 100 [IU]/ML
35 INJECTION, SOLUTION SUBCUTANEOUS
Status: DISCONTINUED | OUTPATIENT
Start: 2021-09-03 | End: 2021-09-04

## 2021-09-03 RX ADMIN — Medication 10 ML: at 05:48

## 2021-09-03 RX ADMIN — INSULIN LISPRO 20 UNITS: 100 INJECTION, SOLUTION INTRAVENOUS; SUBCUTANEOUS at 11:26

## 2021-09-03 RX ADMIN — DEXAMETHASONE 6 MG: 4 TABLET ORAL at 15:58

## 2021-09-03 RX ADMIN — DEXAMETHASONE 6 MG: 4 TABLET ORAL at 05:47

## 2021-09-03 RX ADMIN — INSULIN LISPRO 20 UNITS: 100 INJECTION, SOLUTION INTRAVENOUS; SUBCUTANEOUS at 16:40

## 2021-09-03 RX ADMIN — INSULIN LISPRO 20 UNITS: 100 INJECTION, SOLUTION INTRAVENOUS; SUBCUTANEOUS at 08:41

## 2021-09-03 RX ADMIN — Medication 5 MG: at 21:11

## 2021-09-03 RX ADMIN — Medication 10 ML: at 15:51

## 2021-09-03 RX ADMIN — INSULIN LISPRO 4 UNITS: 100 INJECTION, SOLUTION INTRAVENOUS; SUBCUTANEOUS at 16:41

## 2021-09-03 RX ADMIN — INSULIN LISPRO 4 UNITS: 100 INJECTION, SOLUTION INTRAVENOUS; SUBCUTANEOUS at 11:26

## 2021-09-03 RX ADMIN — DEXAMETHASONE 6 MG: 4 TABLET ORAL at 21:11

## 2021-09-03 RX ADMIN — Medication 10 ML: at 21:11

## 2021-09-03 RX ADMIN — INSULIN GLARGINE 35 UNITS: 100 INJECTION, SOLUTION SUBCUTANEOUS at 21:09

## 2021-09-03 NOTE — PROGRESS NOTES
Problem: Falls - Risk of  Goal: *Absence of Falls  Description: Document Yaneth Cates Fall Risk and appropriate interventions in the flowsheet. Outcome: Progressing Towards Goal  Note: Fall Risk Interventions:  Mobility Interventions: Bed/chair exit alarm, Patient to call before getting OOB    Mentation Interventions: Adequate sleep, hydration, pain control    Medication Interventions: Bed/chair exit alarm, Patient to call before getting OOB    Elimination Interventions: Bed/chair exit alarm, Call light in reach, Patient to call for help with toileting needs    History of Falls Interventions: Bed/chair exit alarm         Problem: Patient Education: Go to Patient Education Activity  Goal: Patient/Family Education  Outcome: Progressing Towards Goal     Problem: Airway Clearance - Ineffective  Goal: Achieve or maintain patent airway  Outcome: Progressing Towards Goal     Problem: Gas Exchange - Impaired  Goal: Absence of hypoxia  Outcome: Progressing Towards Goal  Goal: Promote optimal lung function  Outcome: Progressing Towards Goal     Problem: Breathing Pattern - Ineffective  Goal: Ability to achieve and maintain a regular respiratory rate  Outcome: Progressing Towards Goal     Problem:  Body Temperature -  Risk of, Imbalanced  Goal: Ability to maintain a body temperature within defined limits  Outcome: Progressing Towards Goal  Goal: Will regain or maintain usual level of consciousness  Outcome: Progressing Towards Goal  Goal: Complications related to the disease process, condition or treatment will be avoided or minimized  Outcome: Progressing Towards Goal     Problem: Isolation Precautions - Risk of Spread of Infection  Goal: Prevent transmission of infectious organism to others  Outcome: Progressing Towards Goal     Problem: Nutrition Deficits  Goal: Optimize nutrtional status  Outcome: Progressing Towards Goal     Problem: Risk for Fluid Volume Deficit  Goal: Maintain normal heart rhythm  Outcome: Progressing Towards Goal  Goal: Maintain absence of muscle cramping  Outcome: Progressing Towards Goal  Goal: Maintain normal serum potassium, sodium, calcium, phosphorus, and pH  Outcome: Progressing Towards Goal     Problem: Loneliness or Risk for Loneliness  Goal: Demonstrate positive use of time alone when socialization is not possible  Outcome: Progressing Towards Goal     Problem: Fatigue  Goal: Verbalize increase energy and improved vitality  Outcome: Progressing Towards Goal     Problem: Patient Education: Go to Patient Education Activity  Goal: Patient/Family Education  Outcome: Progressing Towards Goal     Problem: Patient Education: Go to Patient Education Activity  Goal: Patient/Family Education  Outcome: Progressing Towards Goal     Problem: Pneumonia: Day 1  Goal: Off Pathway (Use only if patient is Off Pathway)  Outcome: Progressing Towards Goal  Goal: Activity/Safety  Outcome: Progressing Towards Goal  Goal: Consults, if ordered  Outcome: Progressing Towards Goal  Goal: Diagnostic Test/Procedures  Outcome: Progressing Towards Goal  Goal: Nutrition/Diet  Outcome: Progressing Towards Goal  Goal: Medications  Outcome: Progressing Towards Goal  Goal: Respiratory  Outcome: Progressing Towards Goal  Goal: Treatments/Interventions/Procedures  Outcome: Progressing Towards Goal  Goal: Psychosocial  Outcome: Progressing Towards Goal  Goal: *Oxygen saturation within defined limits  Outcome: Progressing Towards Goal  Goal: *Influenza vaccine administered (October-March)  Outcome: Progressing Towards Goal  Goal: *Pneumoccocal vaccine administered  Outcome: Progressing Towards Goal  Goal: *Hemodynamically stable  Outcome: Progressing Towards Goal  Goal: *Demonstrates progressive activity  Outcome: Progressing Towards Goal  Goal: *Tolerating diet  Outcome: Progressing Towards Goal     Problem: Pneumonia: Day 2  Goal: Off Pathway (Use only if patient is Off Pathway)  Outcome: Progressing Towards Goal  Goal: Activity/Safety  Outcome: Progressing Towards Goal  Goal: Consults, if ordered  Outcome: Progressing Towards Goal  Goal: Diagnostic Test/Procedures  Outcome: Progressing Towards Goal  Goal: Nutrition/Diet  Outcome: Progressing Towards Goal  Goal: Discharge Planning  Outcome: Progressing Towards Goal  Goal: Medications  Outcome: Progressing Towards Goal  Goal: Respiratory  Outcome: Progressing Towards Goal  Goal: Treatments/Interventions/Procedures  Outcome: Progressing Towards Goal  Goal: Psychosocial  Outcome: Progressing Towards Goal  Goal: *Oxygen saturation within defined limits  Outcome: Progressing Towards Goal  Goal: *Hemodynamically stable  Outcome: Progressing Towards Goal  Goal: *Demonstrates progressive activity  Outcome: Progressing Towards Goal  Goal: *Tolerating diet  Outcome: Progressing Towards Goal  Goal: *Optimal pain control at patient's stated goal  Outcome: Progressing Towards Goal     Problem: Pneumonia: Day 3  Goal: Off Pathway (Use only if patient is Off Pathway)  Outcome: Progressing Towards Goal  Goal: Activity/Safety  Outcome: Progressing Towards Goal  Goal: Consults, if ordered  Outcome: Progressing Towards Goal  Goal: Diagnostic Test/Procedures  Outcome: Progressing Towards Goal  Goal: Nutrition/Diet  Outcome: Progressing Towards Goal  Goal: Discharge Planning  Outcome: Progressing Towards Goal  Goal: Medications  Outcome: Progressing Towards Goal  Goal: Respiratory  Outcome: Progressing Towards Goal  Goal: Treatments/Interventions/Procedures  Outcome: Progressing Towards Goal  Goal: Psychosocial  Outcome: Progressing Towards Goal  Goal: *Oxygen saturation within defined limits  Outcome: Progressing Towards Goal  Goal: *Hemodynamically stable  Outcome: Progressing Towards Goal  Goal: *Demonstrates progressive activity  Outcome: Progressing Towards Goal  Goal: *Tolerating diet  Outcome: Progressing Towards Goal  Goal: *Describes available resources and support systems  Outcome: Progressing Towards Goal  Goal: *Optimal pain control at patient's stated goal  Outcome: Progressing Towards Goal     Problem: Pneumonia: Day 4  Goal: Off Pathway (Use only if patient is Off Pathway)  Outcome: Progressing Towards Goal  Goal: Activity/Safety  Outcome: Progressing Towards Goal  Goal: Nutrition/Diet  Outcome: Progressing Towards Goal  Goal: Discharge Planning  Outcome: Progressing Towards Goal  Goal: Medications  Outcome: Progressing Towards Goal  Goal: Respiratory  Outcome: Progressing Towards Goal  Goal: Treatments/Interventions/Procedures  Outcome: Progressing Towards Goal  Goal: Psychosocial  Outcome: Progressing Towards Goal     Problem: Pneumonia: Discharge Outcomes  Goal: *Demonstrates progressive activity  Outcome: Progressing Towards Goal  Goal: *Describes follow-up/return visits to physicians  Outcome: Progressing Towards Goal  Goal: *Tolerating diet  Outcome: Progressing Towards Goal  Goal: *Verbalizes name, dosage, time, side effects, and number of days to continue medications  Outcome: Progressing Towards Goal  Goal: *Influenza immunization  Outcome: Progressing Towards Goal  Goal: *Pneumococcal immunization  Outcome: Progressing Towards Goal  Goal: *Respiratory status at baseline  Outcome: Progressing Towards Goal  Goal: *Vital signs within defined limits  Outcome: Progressing Towards Goal  Goal: *Describes available resources and support systems  Outcome: Progressing Towards Goal  Goal: *Optimal pain control at patient's stated goal  Outcome: Progressing Towards Goal

## 2021-09-03 NOTE — PROGRESS NOTES
[facility] Hospitalist Service Progress Note    Interval History:  Jody Barfield is a 70 y/o CM with a PMH  HTN who presented to the ER 8/27/21with a complaint of cough, SOB,nausea and diarrhea x 1 week. He had been recently diagnosed COVID positive 4 days ago. He reports he has gotten 1/2 COVID vaccines and was scheduled for 2nd a week later. He symptoms progressively worsened prompting him seeking emergency services and subsequent hospital admission. Sats noted to be ~ 85% on EMS arrival. CXR showing nonspecific bilateral lung infiltrates    Assessment / Plan:  Acute respiratory failure with hypoxia related to COVID 19 infection:  Dx 8/24, requiring oxygen which is new for him  8/29 procal 2.01, ABT will continue. leigionella and mycoplasma, strep pending. Day 2 of Remdesivir  Encourage IS use, deep breathing  8/30 requiring higher oxygen use, now on 12 liters with sats mid 90s. CT chest COVID PNA, right small pleural effusion. Increasing Dexamethasone to 6 mg Q8h  Lasix x 1 IV  Check BNP  Day 3 of Remdesivir  Day 4 Rocephin, Day 3 Doxycycline  COVID labs improving to include Procal reduction. 8/31 Rocephin EOT today.  Doxy EOT 9/1.  Continue steroids.  remdesivir EOT 9/1.  Increase in O2 demands today to 14 L.     9/1 up to 15 liters. Repeat CRP down, ddimer slightly up. 9/2/2021 tolerated wean from 15L to 13L; cont weaning as tolerated; cxray from 9/1/2021 with interval improvement b/l lung fields  9//3/21 Patient now on 10L high flow; Continue weaning as able; Decadron; IS; PT with recs for [place]     DM2:  A1C on 8/24 from doctor office noted 12  SSI   Adding Lantus insulin 15 units nightly. *Patient on Dexamethasone so expect increase in BS  8/29 increasing Lantus to 20 units nightly, diabetic educator consult placed. 8/30 remains elevated with steroid use, adding 3 units premeal and increasing Lantus to 30 units nightly.   8/31 increase prandial insulin to humalog 6 units with meals  9/1 increase lantus to 45 units, increase prandial to 16 units with meals  9/2/2021 increased pre-meal insulin dose; cont lantus 45U qhs as his glucose in AM lab draw acceptable range  9/3/21 Lantus adjusted down to 35U d/t low morning glucose levels     Essential HTN:  Well controlled, not on any medications.        Thrombocytopenia:  Noted 26  On 8/24 noted 98  Likely related to [place] 19 infection  Patient states having a H/O same over 10 years ago  Trend for now, transfuse if less than 10  8/29 now 67, pathologist review smear pending  8/30 slowly improving now 84, pathologist review still pending  8/31 pathology smear.  Normocytic/normochromic, adequate platelets  5/1/1622 platelet count ~stable; h/h stable no gross bleeding; monitor  9/3/21 Improving now 111; monitor     Small right pleural effusion:  Noted on CT chest 8/30  Check BNP  Give Lasix 40 mg IV x 1  I&O   9/2/2021 improved cxray from 9/2/2021; no further needs for diuretic tx       Chief Complaint : Shortness of Breath      Subjective:  \"I am ready to go home. I am not able to sleep here. \" Also c/o AGUILERA and poor appetite    No acute events overnight,   Cardiovascular, respiratory, GI review of system negative except mentioned above  Objective:  Visit Vitals  /74 (BP 1 Location: Left upper arm, BP Patient Position: Sitting)   Pulse 70   Temp 97.8 °F (36.6 °C)   Resp 20   Ht 5' 6\" (1.676 m)   Wt 100.7 kg (222 lb 0.1 oz)   SpO2 100%   BMI 35.83 kg/m²                 Physical Exam:  General: No acute distress, speaking in full sentences, no use of accessory muscles   HEENT: Pupils equal; oropharynx is clear   Neck: no JVD   Lungs: Clear but mildly diminished to auscultation bilaterally   Cardiovascular: Regular rate and rhythm with normal S1 and S2   Abdomen: Soft, nontender, nondistended, normoactive bowel sounds   Extremities: No cyanosis clubbing BLE edema   Neuro: Nonfocal, A&O x3   Psych: Normal affect     Intake and Output:  Date 09/02/21 0700 - 09/03/21 0816 09/03/21 0700 - 09/04/21 0659   Shift 7383-4564 [id number] 24 Hour Total 3868-9442 3184-2298 24 Hour Total   INTAKE   P.O. 120  120        P. O. 120  120      Shift Total(mL/kg) 120(1.2)  120(1.2)      OUTPUT   Shift Total(mL/kg)           120      Weight (kg) 100.7 100.7 100.7 100.7 100.7 100.7       LAB:  No results displayed because visit has over 200 results. IMAGING:  XR CHEST SNGL V    Result Date: 9/1/2021  Bilateral Covid pneumonia significantly improved. XR CHEST PORT    Result Date: 8/27/2021  1. Nonspecific bilateral lung infiltrates. Given the associated cardiomegaly, these could represent pulmonary edema. Additional etiologies are not excluded if suggested by clinical findings. This report was made using voice transcription. Despite my best efforts to avoid any, transcription errors may persist. If there is any question about the accuracy of the report or need for clarification, then please call [phone], or text me through [email or web address] for clarification or correction. CT CHEST PULMONARY EMBOLISM    Result Date: 8/30/2021  No evidence of pulmonary embolism. Bilateral Covid pneumonia. Small hiatal hernia. Small right pleural effusion. Coronary artery disease. Date of Dictation: 8/30/2021 9:41 AM      EKG:  EKG Results     Procedure 720 Value Units Date/Time    EKG, 12 LEAD, INITIAL [[id number]] Collected: 08/27/21 2137    Order Status: Completed Updated: 08/28/21 1013     Ventricular Rate 104 BPM      Atrial Rate 104 BPM      P-R Interval 154 ms      QRS Duration 92 ms      Q-T Interval 364 ms      QTC Calculation (Bezet) 478 ms      Calculated P Axis 32 degrees      Calculated R Axis -41 degrees      Calculated T Axis 22 degrees      Diagnosis --     !! AGE AND GENDER SPECIFIC ECG ANALYSIS !!   Sinus tachycardia with Premature atrial complexes  Left axis deviation  Pulmonary disease pattern  Abnormal ECG  No previous ECGs available  Confirmed by ST JOSHUA CAROLINA MD (), WILL VAZQUEZ ([id number]) on 8/28/2021 10:13:14 AM              Zhao Griffin NP  9/3/2021 9:39 AM

## 2021-09-03 NOTE — DIABETES MGMT
Patient admitted with pneumonia due to COVID-19. Blood glucose ranged 117-371 yesterday with patient receiving Lantus 45 units, Humalog 74 units, and Decadron 18mg. Blood glucose this morning was 98. Reviewed patient current regimen: Lantus 45 units nightly, Humalog 20 units with meals, Humalog correctional insulin, and Decadron 6mg q8h. Patient would likely benefit from a reduction in basal insulin to reduce risk of morning hypoglycemia. Provider updated via [employer] regarding recommendations and patient glycemic control.

## 2021-09-03 NOTE — PROGRESS NOTES
Hourly rounds completed on patient, call light with in reach and bed low and locked. Patient denies needs at this time. Will report to oncoming nurse.

## 2021-09-03 NOTE — PROGRESS NOTES
ACUTE OT GOALS:  (Developed with and agreed upon by patient and/or caregiver.)  1) Patient will complete lower body bathing and dressing with supervision and adaptive equipment as needed. 2) Patient will complete toileting with supervision. 3) Patient will complete functional transfers with supervision and adaptive equipment as needed. 4) Patient will tolerate at least 25 minutes of OT activity with as needed rest breaks while maintaining O2 sats >90%. 5) Patient will verbalize at least 3 energy conservation technique to utilize during ADL/IADL. Timeframe: 7 visits    OCCUPATIONAL THERAPY: Daily Note OT Treatment Day # 4    Herb Sanches is a 69 y.o. male   PRIMARY DIAGNOSIS: Pneumonia due to COVID-19 virus  Pneumonia due to COVID-19 virus [U07.1, J12.82]  Hypertension [I10]       Payor: [employer] MEDICARE / Plan: [address] [place] [address] HMO / Product Type: Managed Care Medicare /   ASSESSMENT:     REHAB RECOMMENDATIONS: CURRENT LEVEL OF FUNCTION:  (Most Recently Demonstrated)   Recommendation to date pending progress:  [address] [place] [address] Therapy  Equipment:    None Bathing:   Not tested  Dressing:   Not tested  Feeding/Grooming:   Contact Guard Assistance   Toileting:   Not tested  Functional Mobility:   Contact Guard Assistance     ASSESSMENT:  Mr. Maricarmen Martinez is progressing well towards OT goals but remains limited by respiratory status. Today, pt was received sitting in chair and continued to require max encouragement from therapist for participation. Functional transfers and ambulation with no AD CGA. Grooming tasks standing at sink CGA. Pt's O2 sats remained in the 90s throughout entire session on 10L HFNC. Mr. Maricarmen Martinez continues to demonstrate overall deficits in strength, balance, activity tolerance, and ADL performance. Continue OT efforts and POC in order to address functional deficits and OT goals stated above. SUBJECTIVE:   Mr. Maricarmen Martinez states, \"Fine. I'll do it. \"    SOCIAL HISTORY/LIVING ENVIRONMENT:  lives with wife, 2 level home but lives on 1st level, tub shower, 1 recent fall, does not drive, no DME in the home     OBJECTIVE:     PAIN: VITAL SIGNS: LINES/DRAINS:   Pre Treatment: Pain Screen  Pain Scale 1: Numeric (0 - 10)  Pain Intensity 1: 0  Post Treatment: no change  Vital Signs  O2 Sat (%): 95 %  O2 Device: Hi flow nasal cannula  O2 Flow Rate (L/min): 10 l/min   O2 Device: Hi flow nasal cannula     ACTIVITIES OF DAILY LIVING: I Mod I S SBA CGA Min Mod Max Total NT Comments   BASIC ADLs:              Bathing/ Showering [] [] [] [] [] [] [] [] [] [x]    Toileting [] [] [] [] [] [] [] [] [] [x]    Dressing [] [] [] [] [] [] [] [] [] [x]    Feeding [] [] [] [] [] [] [] [] [] [x]    Grooming [] [] [] [] [x] [] [] [] [] [] Washed face and brushed teeth standing at sink   Personal Device Care [] [] [] [] [] [] [] [] [] []    Functional Mobility [] [] [] [] [x] [] [] [] [] [] Ambulated in room CGA   I=Independent, Mod I=Modified Independent, S=Supervision, SBA=Standby Assistance, CGA=Contact Guard Assistance,   Min=Minimal Assistance, Mod=Moderate Assistance, Max=Maximal Assistance, Total=Total Assistance, NT=Not Tested    MOBILITY: I Mod I S SBA CGA Min Mod Max Total  NT x2 Comments:   Supine to sit [] [] [] [] [] [] [] [] [] [x] [] Received in chair    Sit to supine [] [] [] [] [] [] [] [] [] [x] [] Left in chair    Sit to stand [] [] [] [] [x] [] [] [] [] [] []    Bed to chair [] [] [] [] [] [] [] [] [] [] [] CGA for ambulation in room   I=Independent, Mod I=Modified Independent, S=Supervision, SBA=Standby Assistance, CGA=Contact Guard Assistance,   Min=Minimal Assistance, Mod=Moderate Assistance, Max=Maximal Assistance, Total=Total Assistance, NT=Not Tested    BALANCE: Good Fair+ Fair Fair- Poor NT Comments   Sitting Static [x] [] [] [] [] []    Sitting Dynamic [] [x] [] [] [] []              Standing Static [] [x] [] [] [] []    Standing Dynamic [] [x] [] [] [] []      PLAN: FREQUENCY/DURATION: OT Plan of Care: 3 times/week for duration of hospital stay or until stated goals are met, whichever comes first.    TREATMENT:   TREATMENT:   ($$ Self Care/Home Management: 8-22 mins$$ Therapeutic Activity: 8-22 mins   )  Therapeutic Activity (15 Minutes): Therapeutic activity included Transfer Training, Ambulation on level ground, Sitting balance  and Standing balance to improve functional Mobility, Strength and Activity tolerance. Self Care (15 Minutes): Self care including Grooming and Energy Conservation Training to increase independence and decrease level of assistance required.     TREATMENT GRID:  N/A    AFTER TREATMENT POSITION/PRECAUTIONS:  Chair, Needs within reach and RN notified    INTERDISCIPLINARY COLLABORATION:  RN/PCT and OT/EVANS    TOTAL TREATMENT DURATION:  OT Patient Time In/Time Out  Time In: 7252  Time Out: [address]48 [address]

## 2021-09-03 NOTE — DIABETES MGMT
Patient admitted with COVID-19. Patient seen remotely from within hospital via computer Zoom meeting for assessment regarding diabetes management. Patient has a past medical history of hypertension. Pt states that he has never been diagnosed with diabetes or told his blood glucose was elevated in the past and states that he has no family history of diabetes. Pt states that he would be willing to listen about diabetes for a little while. Patient given educational material, \"Diabetes Self-Management: A Patient Teaching Guide\", which was briefly reviewed with patient. Explained basic physiology of diabetes, as well as causes, signs and symptoms, and treatments for hypoglycemia and hyperglycemia. Described the effects of poor glycemic control and the development of long-term complications such as renal, eye, nerve, and cardiovascular disease. Explained the relationship between hyperglycemia and infection and delayed healing. Discussed target goals for blood glucose and A1C and the significance of an HbA1c of 12.2%. Explained the importance of blood glucose monitoring. Attempted to teach blood glucose monitoring technique. Pt refuses further diabetes education, stating that he is too tired at this time. Pt states that I could try to reach his spouse to provide diabetes education and that she could help him at home with blood glucose meter monitoring and insulin injections. Pt agrees to a referral for outpatient diabetes education. Spoke with patient's wife via hospital phone system. Mrs. Jeronimo Burkett states that patient was diagnosed with type 2 DM 1 week ago and prescribed Metformin. States that pt only took one dose of Metformin, but vomited up the dose. Wife states that she would like to talk about diabetes via the phone. Explained basic physiology of diabetes, as well as causes, signs and symptoms, and treatments for hypoglycemia and hyperglycemia.  Described the effects of poor glycemic control and the development of long-term complications such as renal, eye, nerve, and cardiovascular disease. Described proper diabetic foot care and the importance of checking feet daily. Per Mrs. Braden, patient typically drinks, sweet tea, apple juice and George-Aid. Reviewed effects of sweetened beverages on glycemic control and discussed alternative beverages to help improve glycemic control. Spouse states that patient likes to eat Junk food and little Mine snacks. Educated re: effects of carbohydrates on blood glucose, the \"plate method\" of healthy meal planning, basics of healthy meal plan, and Consistent Carbohydrate Diet. Explained the importance of blood glucose monitoring and how this will provide the primary care provider with more information to help them safely titrate their regimen. Recommended frequency of QID and to record in log book to take to primary care provider appointment. Explained the relationship between hyperglycemia and infection and delayed healing. Discussed target goals for blood glucose and A1C and the significance of an HbA1c of 12.2%. Mrs. Dav Santos states that she knows how to monitor blood glucose because she had a meter and monitored her blood glucose due to episodes of hypoglycemia. States that she will be able to monitor patient's blood glucose at home. Patient will need prescription for glucometer kit, test strips, and lancets at discharge so that the patient may obtain the meter covered by their insurance. Educated patient regarding current basal/bolus regimen of Lantus 35 units hs, Humalog 20 units 3x/day ac, and Humalog correctional scale coverage 4x/day ac and hs, including type of insulin, timing with meals, onset, duration of effect, and peak of insulin dose. Educated patient on the differences between prandial insulin and correctional insulin.   Instructed spouse to always seek guidance from patient's primary care provider about adjusting their insulin doses and not to adjust them on their own as this could negatively impact their glycemic control or result in hypoglycemia. Discussed the importance of site rotation, and proper storage of insulin. Mrs. John Madrid states that she would prefer her spouse be discharge home on insulin pens. States that she has seen her Kory Gretel using them in the past. Mrs. John Madrid states that she plans on watching an instruction video online about using the Lantus SoloStar insulin pen and Humalog Kwik pens. Spouse would prefer insulin pens at discharge. Will need prescriptions for insulin pens and pen needles. Stressed the importance of follow up care with PCP at discharge. Referral for a PCP has been initiated to [facility]. Case management is assisting with discharge planning. Discussed the importance of monitoring blood glucose qid and recording in blood glucose log book to bring to PCP appointments, following a consistent carbohydrate diet, and, medication compliance. Pt would likely benefit from home health nursing for further diabetes education, supervision of diabetes regimen and nutritional counseling. Discussed with case management. Referral to [facility] has been initiated and pt and spouse are agreeable to plan. Mrs. John Madrid has no further questions regarding diabetes management at this time.

## 2021-09-04 LAB
GLUCOSE BLD STRIP.AUTO-MCNC: 132 MG/DL (ref 65–100)
GLUCOSE BLD STRIP.AUTO-MCNC: 232 MG/DL (ref 65–100)
GLUCOSE BLD STRIP.AUTO-MCNC: 265 MG/DL (ref 65–100)
GLUCOSE BLD STRIP.AUTO-MCNC: 83 MG/DL (ref 65–100)
SERVICE CMNT-IMP: ABNORMAL
SERVICE CMNT-IMP: NORMAL

## 2021-09-04 PROCEDURE — 74011250636 HC RX REV CODE- 250/636: Performed by: NURSE PRACTITIONER

## 2021-09-04 PROCEDURE — 74011636637 HC RX REV CODE- 636/637: Performed by: PHYSICIAN ASSISTANT

## 2021-09-04 PROCEDURE — 77010033711 HC HIGH FLOW OXYGEN

## 2021-09-04 PROCEDURE — 94760 N-INVAS EAR/PLS OXIMETRY 1: CPT

## 2021-09-04 PROCEDURE — 74011636637 HC RX REV CODE- 636/637: Performed by: NURSE PRACTITIONER

## 2021-09-04 PROCEDURE — 74011250636 HC RX REV CODE- 250/636: Performed by: INTERNAL MEDICINE

## 2021-09-04 PROCEDURE — 65270000029 HC RM PRIVATE

## 2021-09-04 PROCEDURE — 74011250637 HC RX REV CODE- 250/637: Performed by: EMERGENCY MEDICINE

## 2021-09-04 PROCEDURE — 82962 GLUCOSE BLOOD TEST: CPT

## 2021-09-04 RX ORDER — EPINEPHRINE 0.1 MG/ML
INJECTION INTRACARDIAC; INTRAVENOUS
Status: COMPLETED | OUTPATIENT
Start: 2021-09-04 | End: 2021-09-04

## 2021-09-04 RX ORDER — TEMAZEPAM 15 MG/1
15 CAPSULE ORAL
Status: DISCONTINUED | OUTPATIENT
Start: 2021-09-04 | End: 2021-09-05

## 2021-09-04 RX ORDER — INSULIN GLARGINE 100 [IU]/ML
30 INJECTION, SOLUTION SUBCUTANEOUS
Status: DISCONTINUED | OUTPATIENT
Start: 2021-09-04 | End: 2021-09-05

## 2021-09-04 RX ORDER — DEXAMETHASONE 4 MG/1
6 TABLET ORAL EVERY 12 HOURS
Status: DISCONTINUED | OUTPATIENT
Start: 2021-09-04 | End: 2021-09-05

## 2021-09-04 RX ADMIN — SODIUM CHLORIDE 1000 ML: 900 INJECTION, SOLUTION INTRAVENOUS at 19:52

## 2021-09-04 RX ADMIN — EPINEPHRINE 1 MG: 0.1 INJECTION, SOLUTION ENDOTRACHEAL; INTRACARDIAC; INTRAVENOUS at 18:16

## 2021-09-04 RX ADMIN — DEXAMETHASONE 6 MG: 4 TABLET ORAL at 05:39

## 2021-09-04 RX ADMIN — Medication 10 ML: at 05:39

## 2021-09-04 RX ADMIN — Medication 10 ML: at 22:57

## 2021-09-04 RX ADMIN — INSULIN LISPRO 6 UNITS: 100 INJECTION, SOLUTION INTRAVENOUS; SUBCUTANEOUS at 16:58

## 2021-09-04 RX ADMIN — Medication 5 MG: at 22:39

## 2021-09-04 RX ADMIN — INSULIN GLARGINE 30 UNITS: 100 INJECTION, SOLUTION SUBCUTANEOUS at 22:38

## 2021-09-04 RX ADMIN — Medication 10 ML: at 13:38

## 2021-09-04 RX ADMIN — INSULIN LISPRO 20 UNITS: 100 INJECTION, SOLUTION INTRAVENOUS; SUBCUTANEOUS at 16:58

## 2021-09-04 RX ADMIN — INSULIN LISPRO 4 UNITS: 100 INJECTION, SOLUTION INTRAVENOUS; SUBCUTANEOUS at 22:39

## 2021-09-04 NOTE — PROGRESS NOTES
[facility] Progress Note    Interval History:  Herb Sanches is a 70 y/o CM with a PMH  HTN who presented to the ER 8/27/21with a complaint of cough, SOB,nausea and diarrhea x 1 week. He had been recently diagnosed COVID positive 4 days ago. He reports he has gotten 1/2 COVID vaccines and was scheduled for 2nd a week later. He symptoms progressively worsened prompting him seeking emergency services and subsequent hospital admission. Sats noted to be ~ 85% on EMS arrival. CXR showing nonspecific bilateral lung infiltrates    Assessment / Plan:  Acute respiratory failure with hypoxia related to COVID 19 infection:  Dx 8/24, requiring oxygen which is new for him  8/29 procal 2.01, ABT will continue. leigionella and mycoplasma, strep pending. Day 2 of Remdesivir  Encourage IS use, deep breathing  8/30 requiring higher oxygen use, now on 12 liters with sats mid 90s. CT chest COVID PNA, right small pleural effusion. Increasing Dexamethasone to 6 mg Q8h  Lasix x 1 IV  Check BNP  Day 3 of Remdesivir  Day 4 Rocephin, Day 3 Doxycycline  COVID labs improving to include Procal reduction. 8/31 Rocephin EOT today.  Doxy EOT 9/1.  Continue steroids.  remdesivir EOT 9/1.  Increase in O2 demands today to 14 L.     9/1 up to 15 liters. Repeat CRP down, ddimer slightly up.    9/2/2021 tolerated wean from 15L to 13L; cont weaning as tolerated; cxray from 9/1/2021 with interval improvement b/l lung fields  9//3/21 Patient now on 10L high flow; Continue weaning as able; Decadron; IS; PT with recs for [place]   9/4/21 Patient now on 6L fr 8L yesterday; continue to wean as able; titrating steroids      DM2:  A1C on 8/24 from doctor office noted 12  SSI   Adding Lantus insulin 15 units nightly. *Patient on Dexamethasone so expect increase in BS  8/29 increasing Lantus to 20 units nightly, diabetic educator consult placed.   8/30 remains elevated with steroid use, adding 3 units premeal and increasing Lantus to 30 units nightly.   8/31 increase prandial insulin to humalog 6 units with meals  9/1 increase lantus to 45 units, increase prandial to 16 units with meals  9/2/2021 increased pre-meal insulin dose; cont lantus 45U qhs as his glucose in AM lab draw acceptable range  9/3/21 Lantus adjusted down to 35U d/t low morning glucose levels  9/4/21 Lantus adjusted to 30U d/t low morning glucose level     Essential HTN:  Well controlled, not on any medications.        Thrombocytopenia:  Noted 26  On 8/24 noted 98  Likely related to COVID 19 infection  Patient states having a H/O same over 10 years ago  Trend for now, transfuse if less than 10  8/29 now 67, pathologist review smear pending  8/30 slowly improving now 84, pathologist review still pending  8/31 pathology smear.  Normocytic/normochromic, adequate platelets  3/9/3309 QHKCZWGD count ~stable; h/h stable no gross bleeding; monitor  9/3/21 Improving now 111; monitor     Small right pleural effusion:  Noted on CT chest 8/30  Check BNP  Give Lasix 40 mg IV x 1  I&O   9/2/2021 improved cxray from 9/2/2021; no further needs for diuretic tx          Chief Complaint : Shortness of Breath      Subjective:  Patient reports continued AGUILERA but is improved since admit   No acute events overnight, patient has no new complaints today,  Cardiovascular, respiratory, GI review of system negative except mentioned above  Objective:  Visit Vitals  /68   Pulse 61   Temp 97.4 °F (36.3 °C)   Resp 20   Ht 5' 6\" (1.676 m)   Wt 102.6 kg (226 lb 3.1 oz)   SpO2 98%   BMI 36.51 kg/m²                 Physical Exam:  General: No acute distress, speaking in full sentences, no use of accessory muscles   HEENT: Pupils equal; oropharynx is clear   Neck: no JVD   Lungs: Clear to auscultation bilaterally   Cardiovascular: Regular rate and rhythm with normal S1 and S2   Abdomen: Soft, nontender, nondistended, normoactive bowel sounds   Extremities: No cyanosis clubbing or edema   Neuro: Nonfocal, A&O x3 Psych: Normal affect     Intake and Output:  Date 09/03/21 0700 - 09/04/21 0659 09/04/21 0700 - 09/05/21 0659   Shift [id number] 1900-0659 24 Hour Total 3907-1827 7995-0171 24 Hour Total   INTAKE   P.O. 480  480 120  120     P. O. 480  480 120  120   Shift Total(mL/kg) 480(4.8)  480(4.7) 120(1.2)  120(1.2)   OUTPUT   Urine(mL/kg/hr)           Urine Occurrence(s) 3 x  3 x      Stool           Stool Occurrence(s) 1 x  1 x      Shift Total(mL/kg)           480 120  120   Weight (kg) 100.7 102.6 102.6 102.6 102.6 102.6       LAB:  No results displayed because visit has over 200 results. IMAGING:  XR CHEST SNGL V    Result Date: 9/1/2021  Bilateral Covid pneumonia significantly improved. CT CHEST PULMONARY EMBOLISM    Result Date: 8/30/2021  No evidence of pulmonary embolism. Bilateral Covid pneumonia. Small hiatal hernia. Small right pleural effusion. Coronary artery disease. Date of Dictation: 8/30/2021 9:41 AM      EKG:  EKG Results     Procedure 720 Value Units Date/Time    EKG, 12 LEAD, INITIAL [[id number]] Collected: 08/27/21 2137    Order Status: Completed Updated: 08/28/21 1013     Ventricular Rate 104 BPM      Atrial Rate 104 BPM      P-R Interval 154 ms      QRS Duration 92 ms      Q-T Interval 364 ms      QTC Calculation (Bezet) 478 ms      Calculated P Axis 32 degrees      Calculated R Axis -41 degrees      Calculated T Axis 22 degrees      Diagnosis --     !! AGE AND GENDER SPECIFIC ECG ANALYSIS !!   Sinus tachycardia with Premature atrial complexes  Left axis deviation  Pulmonary disease pattern  Abnormal ECG  No previous ECGs available  Confirmed by ST JOSHUA CAROLINA MD (), WILL VAZQUEZ ([id number]) on 8/28/2021 10:13:14 AM              Avtar Reddy NP  9/4/2021 10:25 AM

## 2021-09-05 PROBLEM — U07.1 ACUTE HYPOXEMIC RESPIRATORY FAILURE DUE TO COVID-19 (HCC): Status: ACTIVE | Noted: 2021-08-27

## 2021-09-05 PROBLEM — E11.9 DM (DIABETES MELLITUS) (HCC): Status: RESOLVED | Noted: 2021-08-28 | Resolved: 2021-09-05

## 2021-09-05 PROBLEM — G47.01 SLEEP DISORDER DUE TO A GENERAL MEDICAL CONDITION, INSOMNIA TYPE: Status: ACTIVE | Noted: 2021-09-05

## 2021-09-05 PROBLEM — E11.65 TYPE 2 DIABETES MELLITUS WITH HYPERGLYCEMIA, WITHOUT LONG-TERM CURRENT USE OF INSULIN (HCC): Status: ACTIVE | Noted: 2021-08-28

## 2021-09-05 PROBLEM — E66.01 CLASS 2 SEVERE OBESITY WITH SERIOUS COMORBIDITY AND BODY MASS INDEX (BMI) OF 36.0 TO 36.9 IN ADULT (HCC): Status: ACTIVE | Noted: 2021-09-05

## 2021-09-05 LAB
ANION GAP SERPL CALC-SCNC: 4 MMOL/L (ref 7–16)
BUN SERPL-MCNC: 19 MG/DL (ref 8–23)
CALCIUM SERPL-MCNC: 8.5 MG/DL (ref 8.3–10.4)
CHLORIDE SERPL-SCNC: 107 MMOL/L (ref 98–107)
CO2 SERPL-SCNC: 29 MMOL/L (ref 21–32)
CREAT SERPL-MCNC: 0.63 MG/DL (ref 0.8–1.5)
ERYTHROCYTE [DISTWIDTH] IN BLOOD BY AUTOMATED COUNT: 13 %
GLUCOSE BLD STRIP.AUTO-MCNC: 109 MG/DL (ref 65–100)
GLUCOSE BLD STRIP.AUTO-MCNC: 119 MG/DL (ref 65–100)
GLUCOSE BLD STRIP.AUTO-MCNC: 120 MG/DL (ref 65–100)
GLUCOSE BLD STRIP.AUTO-MCNC: 224 MG/DL (ref 65–100)
GLUCOSE SERPL-MCNC: 80 MG/DL (ref 65–100)
HCT VFR BLD AUTO: 43.5 % (ref 41.1–50.3)
HGB BLD-MCNC: 14.4 G/DL (ref 13.6–17.2)
MCH RBC QN AUTO: 29.2 PG (ref 26.1–32.9)
MCHC RBC AUTO-ENTMCNC: 33.1 G/DL (ref 31.4–35)
MCV RBC AUTO: 88.2 FL (ref 79.6–97.8)
NRBC # BLD: 0 K/UL
PLATELET # BLD AUTO: 157 K/UL
PMV BLD AUTO: 10.1 FL (ref 9.4–12.3)
POTASSIUM SERPL-SCNC: 4.6 MMOL/L (ref 3.5–5.1)
RBC # BLD AUTO: 4.93 M/UL
SERVICE CMNT-IMP: ABNORMAL
SODIUM SERPL-SCNC: 140 MMOL/L (ref 136–145)
WBC # BLD AUTO: 13.1 K/UL (ref 4.3–11.1)

## 2021-09-05 PROCEDURE — 74011636637 HC RX REV CODE- 636/637: Performed by: NURSE PRACTITIONER

## 2021-09-05 PROCEDURE — 74011250637 HC RX REV CODE- 250/637: Performed by: EMERGENCY MEDICINE

## 2021-09-05 PROCEDURE — 85027 COMPLETE CBC AUTOMATED: CPT

## 2021-09-05 PROCEDURE — 74011636637 HC RX REV CODE- 636/637: Performed by: PHYSICIAN ASSISTANT

## 2021-09-05 PROCEDURE — 74011250637 HC RX REV CODE- 250/637: Performed by: FAMILY MEDICINE

## 2021-09-05 PROCEDURE — 74011636637 HC RX REV CODE- 636/637: Performed by: FAMILY MEDICINE

## 2021-09-05 PROCEDURE — 65270000029 HC RM PRIVATE

## 2021-09-05 PROCEDURE — 82962 GLUCOSE BLOOD TEST: CPT

## 2021-09-05 PROCEDURE — 74011250636 HC RX REV CODE- 250/636: Performed by: NURSE PRACTITIONER

## 2021-09-05 PROCEDURE — 36415 COLL VENOUS BLD VENIPUNCTURE: CPT

## 2021-09-05 PROCEDURE — 74011250636 HC RX REV CODE- 250/636: Performed by: FAMILY MEDICINE

## 2021-09-05 PROCEDURE — 80048 BASIC METABOLIC PNL TOTAL CA: CPT

## 2021-09-05 RX ORDER — INSULIN GLARGINE 100 [IU]/ML
28 INJECTION, SOLUTION SUBCUTANEOUS
Status: DISCONTINUED | OUTPATIENT
Start: 2021-09-05 | End: 2021-09-06

## 2021-09-05 RX ORDER — TRAZODONE HYDROCHLORIDE 50 MG/1
25 TABLET ORAL ONCE
Status: COMPLETED | OUTPATIENT
Start: 2021-09-05 | End: 2021-09-05

## 2021-09-05 RX ORDER — FAMOTIDINE 20 MG/1
20 TABLET, FILM COATED ORAL 2 TIMES DAILY
Status: DISCONTINUED | OUTPATIENT
Start: 2021-09-05 | End: 2021-09-06 | Stop reason: HOSPADM

## 2021-09-05 RX ORDER — TRAZODONE HYDROCHLORIDE 50 MG/1
100 TABLET ORAL
Status: DISCONTINUED | OUTPATIENT
Start: 2021-09-05 | End: 2021-09-06 | Stop reason: HOSPADM

## 2021-09-05 RX ORDER — DEXAMETHASONE 4 MG/1
6 TABLET ORAL DAILY
Status: DISCONTINUED | OUTPATIENT
Start: 2021-09-06 | End: 2021-09-06 | Stop reason: HOSPADM

## 2021-09-05 RX ORDER — ENOXAPARIN SODIUM 100 MG/ML
40 INJECTION SUBCUTANEOUS EVERY 12 HOURS
Status: DISCONTINUED | OUTPATIENT
Start: 2021-09-05 | End: 2021-09-06 | Stop reason: HOSPADM

## 2021-09-05 RX ADMIN — INSULIN GLARGINE 28 UNITS: 100 INJECTION, SOLUTION SUBCUTANEOUS at 21:24

## 2021-09-05 RX ADMIN — INSULIN LISPRO 20 UNITS: 100 INJECTION, SOLUTION INTRAVENOUS; SUBCUTANEOUS at 08:26

## 2021-09-05 RX ADMIN — Medication 10 ML: at 06:02

## 2021-09-05 RX ADMIN — ENOXAPARIN SODIUM 40 MG: 40 INJECTION SUBCUTANEOUS at 13:09

## 2021-09-05 RX ADMIN — DEXAMETHASONE 6 MG: 4 TABLET ORAL at 08:28

## 2021-09-05 RX ADMIN — TRAZODONE HYDROCHLORIDE 25 MG: 50 TABLET ORAL at 11:44

## 2021-09-05 RX ADMIN — FAMOTIDINE 20 MG: 20 TABLET, FILM COATED ORAL at 17:58

## 2021-09-05 RX ADMIN — Medication 5 MG: at 21:11

## 2021-09-05 RX ADMIN — INSULIN LISPRO 20 UNITS: 100 INJECTION, SOLUTION INTRAVENOUS; SUBCUTANEOUS at 16:33

## 2021-09-05 RX ADMIN — Medication 10 ML: at 21:13

## 2021-09-05 RX ADMIN — FAMOTIDINE 20 MG: 20 TABLET, FILM COATED ORAL at 11:44

## 2021-09-05 RX ADMIN — Medication 10 ML: at 13:10

## 2021-09-05 RX ADMIN — INSULIN LISPRO 4 UNITS: 100 INJECTION, SOLUTION INTRAVENOUS; SUBCUTANEOUS at 16:33

## 2021-09-05 RX ADMIN — TRAZODONE HYDROCHLORIDE 100 MG: 50 TABLET ORAL at 21:11

## 2021-09-05 RX ADMIN — INSULIN LISPRO 20 UNITS: 100 INJECTION, SOLUTION INTRAVENOUS; SUBCUTANEOUS at 11:48

## 2021-09-05 RX ADMIN — DEXAMETHASONE 6 MG: 4 TABLET ORAL at 00:22

## 2021-09-05 NOTE — PROGRESS NOTES
Hospitalist Progress Note   Admit Date:  2021  9:30 PM   Name:  Lissett Celestin   Age:  69 y.o. Sex:  male  :  1950   MRN:  [id number]     Presenting Complaint: Shortness of Breath    Reason(s) for Admission: Pneumonia due to COVID-19 virus [U07.1, J12.82]  Hypertension [I10]     Hospital Course & Interval History:   70 y/o CM with a PMH  HTN who presented to the ER 21with a complaint of cough, SOB,nausea and diarrhea x 1 week. He had been recently diagnosed COVID positive 4 days ago. He reports he has gotten 1/2 COVID vaccines and was scheduled for 2nd a week later. He symptoms progressively worsened prompting him seeking emergency services and subsequent hospital admission. Sats noted to be ~ 85% on EMS arrival. CXR showing nonspecific bilateral lung infiltrates    Subjective (21):  No acute events ON. Sitting in bedside recliner. C/o insomnia, unable to sleep well in the hospital. No congestion. Some cough. Review of Systems   Constitutional: Positive for malaise/fatigue. Negative for chills and fever. HENT: Negative for congestion, sinus pain and sore throat. Respiratory: Positive for cough and shortness of breath. Cardiovascular: Negative for chest pain. Psychiatric/Behavioral: The patient has insomnia. Assessment & Plan:     Acute hypoxemic respiratory failure 2/2 COVID-19   COVID+    S/p Remdesivir   S/p empiric abx   15L >> 7L  Reduce decadron to once daily   Add pepcid BID    Insomnia due to [facility] - add trazodone nightly. T2DM - new diagnosis on admission. Uncontrolled.  Reduce basal 28U, cont TID prandial 20 U plus SSI   Lab Results   Component Value Date/Time    Hemoglobin A1c 12.2 (H) 2021 08:10 AM       Obesity - aggressive life style modifications   BMI 36.51 on        Dispo/Discharge Planning:      Home in 1-2 days     Diet:  ADULT ORAL NUTRITION SUPPLEMENT Breakfast, Lunch, Dinner; Diabetic Supplement  ADULT DIET Regular; 5 carb choices (75 gm/meal)  DVT PPx: start LMWH   Code status: Full Code    Active Hospital Problems    Diagnosis Date Noted    DM (diabetes mellitus) ([facility] 08/28/2021    Hypertension 08/27/2021    Pneumonia due to COVID-19 virus 08/27/2021    Thrombocytopenia ([facility] 08/27/2021    Acute respiratory failure with hypoxia ([facility] 08/27/2021       Objective:     Patient Vitals for the past 24 hrs:   Temp Pulse Resp BP SpO2   09/05/21 0400 97.7 °F (36.5 °C) 74 20 116/68 95 %   09/04/21 2311 97.6 °F (36.4 °C) 67  126/71 94 %   09/04/21 2020 98.4 °F (36.9 °C) 76 20 134/70 99 %   09/04/21 1923     95 %   09/04/21 1821  (!) 150  102/65 92 %   09/04/21 1819     94 %   09/04/21 1605 97.9 °F (36.6 °C) 65 22 137/74 99 %   09/04/21 1109 98.3 °F (36.8 °C) 65 20 128/74 90 %     Oxygen Therapy  O2 Sat (%): 95 % (09/05/21 0400)  Pulse via Oximetry: 83 beats per minute (09/04/21 1923)  O2 Device: Hi flow nasal cannula;Humidifier (09/04/21 1923)  Skin Assessment: Clean, dry, & intact (09/03/21 1958)  Skin Protection for O2 Device: No (08/31/21 0427)  O2 Flow Rate (L/min): 7 l/min (09/04/21 1923)    Estimated body mass index is 36.51 kg/m² as calculated from the following:    Height as of this encounter: 5' 6\" (1.676 m). Weight as of this encounter: 102.6 kg (226 lb 3.1 oz). Intake/Output Summary (Last 24 hours) at 9/5/2021 0717  Last data filed at 9/4/2021 0858  Gross per 24 hour   Intake 120 ml   Output    Net 120 ml         Physical Exam:   General:    Obese. Ill appearing NAD   Head:  Normocephalic, atraumatic  Eyes:  Sclerae appear normal.  Pupils equally round. ENT:  Nares appear normal, no drainage. Moist oral mucosa  CV:   RRR. No m/r/g. No jugular venous distension. Lungs:   No increased WOB   Abdomen: Bowel sounds present. Soft, nontender, nondistended. Extremities: No cyanosis or clubbing. No edema  Skin:     No rashes and normal coloration. Warm and dry. Neuro:  Cranial nerves II-XII grossly intact. Sensation intact  Psych:  Irritable  Alert and oriented x3    I have reviewed ordered lab tests and independently visualized imaging below:    Last 24hr Labs:  Recent Results (from the past 24 hour(s))   GLUCOSE, POC    Collection Time: 09/04/21  7:21 AM   Result Value Ref Range    Glucose (POC) 83 65 - 100 mg/dL    Performed by [facility]    GLUCOSE, POC    Collection Time: 09/04/21 10:54 AM   Result Value Ref Range    Glucose (POC) 132 (H) 65 - 100 mg/dL    Performed by [facility]    GLUCOSE, POC    Collection Time: 09/04/21  4:01 PM   Result Value Ref Range    Glucose (POC) 265 (H) 65 - 100 mg/dL    Performed by [facility]    GLUCOSE, POC    Collection Time: 09/04/21  8:20 PM   Result Value Ref Range    Glucose (POC) 232 (H) 65 - 100 mg/dL    Performed by [facility]    METABOLIC PANEL, BASIC    Collection Time: 09/05/21  5:16 AM   Result Value Ref Range    Sodium 140 136 - 145 mmol/L    Potassium 4.6 3.5 - 5.1 mmol/L    Chloride 107 98 - 107 mmol/L    CO2 29 21 - 32 mmol/L    Anion gap 4 (L) 7 - 16 mmol/L    Glucose 80 65 - 100 mg/dL    BUN 19 8 - 23 MG/DL    Creatinine 0.63 (L) 0.8 - 1.5 MG/DL    GFR est AA >60 >60 ml/min/1.73m2    GFR est non-AA >60 >60 ml/min/1.73m2    Calcium 8.5 8.3 - 10.4 MG/DL   CBC W/O DIFF    Collection Time: 09/05/21  5:53 AM   Result Value Ref Range    WBC 13.1 (H) 4.3 - 11.1 K/uL    RBC 4.93 M/uL    HGB 14.4 13.6 - 17.2 g/dL    HCT 43.5 41.1 - 50.3 %    MCV 88.2 79.6 - 97.8 FL    MCH 29.2 26.1 - 32.9 PG    MCHC 33.1 31.4 - 35.0 g/dL    RDW 13.0 %    PLATELET 056 K/uL    MPV 10.1 9.4 - 12.3 FL    ABSOLUTE NRBC 0.00 K/uL       All Micro Results     Procedure Component Value Units Date/Time    MYCOPLASMA AB, IGM [[id number]] Collected: 08/28/21 4691    Order Status: Completed Specimen: Serum Updated: 08/31/21 0135     M. pneumoniae Ab, IgM <770 U/mL      Comment: (NOTE)                              Negative            <770  Clinically significant amount of M. pneumoniae antibody  not detected. Low Positive   770 - 12  M. pneumoniae specific IgM presumptively detected. It  is recommended that another sample be collected 1-2  weeks later to assure reactivity. Positive            >950  Highly significant amount of M. pneumoniae specific  IgM antibody detected. Performed At: [address] [id number]  Rosie Ernandez MD [id number]         Sharonda Mackay [[id number]] Collected: 08/27/21 2330    Order Status: Canceled Specimen: Urine     SClemencia Hutchison Phy, UR/CSF [[id number]] Collected: 08/27/21 2330    Order Status: Rosalia Furnjorge AB, IGM [[id number]] Collected: 08/27/21 2325    Order Status: Canceled Specimen: Serum           Other Studies:  No results found.     Current Meds:  Current Facility-Administered Medications   Medication Dose Route Frequency    temazepam (RESTORIL) capsule 15 mg  15 mg Oral QHS PRN    insulin glargine (LANTUS) injection 30 Units  30 Units SubCUTAneous QHS    dexAMETHasone (DECADRON) tablet 6 mg  6 mg Oral Q12H    insulin lispro (HUMALOG) injection 20 Units  20 Units SubCUTAneous TIDAC    sodium chloride (OCEAN) 0.65 % nasal squeeze bottle 2 Spray  2 Spray Both Nostrils PRN    melatonin tablet 5 mg  5 mg Oral QHS    insulin lispro (HUMALOG) injection   SubCUTAneous AC&HS    sodium chloride (NS) flush 5-40 mL  5-40 mL IntraVENous Q8H    sodium chloride (NS) flush 5-40 mL  5-40 mL IntraVENous PRN    acetaminophen (TYLENOL) tablet 650 mg  650 mg Oral Q6H PRN    Or    acetaminophen (TYLENOL) suppository 650 mg  650 mg Rectal Q6H PRN    polyethylene glycol (MIRALAX) packet 17 g  17 g Oral DAILY PRN    ondansetron (ZOFRAN ODT) tablet 4 mg  4 mg Oral Q8H PRN    Or    ondansetron (ZOFRAN) injection 4 mg  4 mg IntraVENous Q6H PRN       Signed:  Savana Contreras DO    Part of this note may have been written by using a voice dictation software. The note has been proof read but may still contain some grammatical/other typographical errors.

## 2021-09-05 NOTE — PROGRESS NOTES
Problem: Falls - Risk of  Goal: *Absence of Falls  Description: Document Kerwin Barros Fall Risk and appropriate interventions in the flowsheet. Outcome: Progressing Towards Goal  Note: Fall Risk Interventions:  Mobility Interventions: Communicate number of staff needed for ambulation/transfer, Patient to call before getting OOB, PT Consult for mobility concerns    Mentation Interventions: Adequate sleep, hydration, pain control    Medication Interventions: Evaluate medications/consider consulting pharmacy, Patient to call before getting OOB, Teach patient to arise slowly    Elimination Interventions: Bed/chair exit alarm, Call light in reach    History of Falls Interventions: Investigate reason for fall, Evaluate medications/consider consulting pharmacy         Problem: Patient Education: Go to Patient Education Activity  Goal: Patient/Family Education  Outcome: Progressing Towards Goal     Problem: Airway Clearance - Ineffective  Goal: Achieve or maintain patent airway  Outcome: Progressing Towards Goal     Problem: Gas Exchange - Impaired  Goal: Absence of hypoxia  Outcome: Progressing Towards Goal  Goal: Promote optimal lung function  Outcome: Progressing Towards Goal     Problem: Breathing Pattern - Ineffective  Goal: Ability to achieve and maintain a regular respiratory rate  Outcome: Progressing Towards Goal     Problem:  Body Temperature -  Risk of, Imbalanced  Goal: Ability to maintain a body temperature within defined limits  Outcome: Progressing Towards Goal  Goal: Will regain or maintain usual level of consciousness  Outcome: Progressing Towards Goal  Goal: Complications related to the disease process, condition or treatment will be avoided or minimized  Outcome: Progressing Towards Goal     Problem: Isolation Precautions - Risk of Spread of Infection  Goal: Prevent transmission of infectious organism to others  Outcome: Progressing Towards Goal     Problem: Nutrition Deficits  Goal: Optimize nutrtional status  Outcome: Progressing Towards Goal     Problem: Risk for Fluid Volume Deficit  Goal: Maintain normal heart rhythm  Outcome: Progressing Towards Goal  Goal: Maintain absence of muscle cramping  Outcome: Progressing Towards Goal  Goal: Maintain normal serum potassium, sodium, calcium, phosphorus, and pH  Outcome: Progressing Towards Goal     Problem: Loneliness or Risk for Loneliness  Goal: Demonstrate positive use of time alone when socialization is not possible  Outcome: Progressing Towards Goal     Problem: Fatigue  Goal: Verbalize increase energy and improved vitality  Outcome: Progressing Towards Goal     Problem: Patient Education: Go to Patient Education Activity  Goal: Patient/Family Education  Outcome: Progressing Towards Goal     Problem: Patient Education: Go to Patient Education Activity  Goal: Patient/Family Education  Outcome: Progressing Towards Goal     Problem: Diabetes Self-Management  Goal: *Disease process and treatment process  Description: Define diabetes and identify own type of diabetes; list 3 options for treating diabetes. Outcome: Progressing Towards Goal  Goal: *Incorporating nutritional management into lifestyle  Description: Describe effect of type, amount and timing of food on blood glucose; list 3 methods for planning meals. Outcome: Progressing Towards Goal  Goal: *Incorporating physical activity into lifestyle  Description: State effect of exercise on blood glucose levels. Outcome: Progressing Towards Goal  Goal: *Developing strategies to promote health/change behavior  Description: Define the ABC's of diabetes; identify appropriate screenings, schedule and personal plan for screenings. Outcome: Progressing Towards Goal  Goal: *Using medications safely  Description: State effect of diabetes medications on diabetes; name diabetes medication taking, action and side effects.   Outcome: Progressing Towards Goal  Goal: *Monitoring blood glucose, interpreting and using results  Description: Identify recommended blood glucose targets  and personal targets. Outcome: Progressing Towards Goal  Goal: *Prevention, detection, treatment of acute complications  Description: List symptoms of hyper- and hypoglycemia; describe how to treat low blood sugar and actions for lowering  high blood glucose level. Outcome: Progressing Towards Goal  Goal: *Prevention, detection and treatment of chronic complications  Description: Define the natural course of diabetes and describe the relationship of blood glucose levels to long term complications of diabetes.   Outcome: Progressing Towards Goal  Goal: *Developing strategies to address psychosocial issues  Description: Describe feelings about living with diabetes; identify support needed and support network  Outcome: Progressing Towards Goal     Problem: Patient Education: Go to Patient Education Activity  Goal: Patient/Family Education  Outcome: Progressing Towards Goal

## 2021-09-05 NOTE — PROGRESS NOTES
Patient complains of discomfort from sitting in chair and requests a cushion. Order sent to materials, cushion delivered, and placed under patient.

## 2021-09-05 NOTE — PROGRESS NOTES
Problem: Falls - Risk of  Goal: *Absence of Falls  Description: Document Belgica Larsen Fall Risk and appropriate interventions in the flowsheet. Outcome: Progressing Towards Goal  Note: Fall Risk Interventions:  Mobility Interventions: Bed/chair exit alarm, Patient to call before getting OOB    Mentation Interventions: Adequate sleep, hydration, pain control    Medication Interventions: Bed/chair exit alarm    Elimination Interventions: Bed/chair exit alarm, Patient to call for help with toileting needs    History of Falls Interventions: Bed/chair exit alarm         Problem: Patient Education: Go to Patient Education Activity  Goal: Patient/Family Education  Outcome: Progressing Towards Goal     Problem: Airway Clearance - Ineffective  Goal: Achieve or maintain patent airway  Outcome: Progressing Towards Goal     Problem: Gas Exchange - Impaired  Goal: Absence of hypoxia  Outcome: Progressing Towards Goal  Goal: Promote optimal lung function  Outcome: Progressing Towards Goal     Problem: Breathing Pattern - Ineffective  Goal: Ability to achieve and maintain a regular respiratory rate  Outcome: Progressing Towards Goal     Problem:  Body Temperature -  Risk of, Imbalanced  Goal: Ability to maintain a body temperature within defined limits  Outcome: Progressing Towards Goal  Goal: Will regain or maintain usual level of consciousness  Outcome: Progressing Towards Goal  Goal: Complications related to the disease process, condition or treatment will be avoided or minimized  Outcome: Progressing Towards Goal     Problem: Isolation Precautions - Risk of Spread of Infection  Goal: Prevent transmission of infectious organism to others  Outcome: Progressing Towards Goal     Problem: Nutrition Deficits  Goal: Optimize nutrtional status  Outcome: Progressing Towards Goal     Problem: Risk for Fluid Volume Deficit  Goal: Maintain normal heart rhythm  Outcome: Progressing Towards Goal  Goal: Maintain absence of muscle cramping  Outcome: Progressing Towards Goal  Goal: Maintain normal serum potassium, sodium, calcium, phosphorus, and pH  Outcome: Progressing Towards Goal     Problem: Loneliness or Risk for Loneliness  Goal: Demonstrate positive use of time alone when socialization is not possible  Outcome: Progressing Towards Goal     Problem: Fatigue  Goal: Verbalize increase energy and improved vitality  Outcome: Progressing Towards Goal     Problem: Patient Education: Go to Patient Education Activity  Goal: Patient/Family Education  Outcome: Progressing Towards Goal     Problem: Patient Education: Go to Patient Education Activity  Goal: Patient/Family Education  Outcome: Progressing Towards Goal     Problem: Pneumonia: Day 1  Goal: Off Pathway (Use only if patient is Off Pathway)  Outcome: Progressing Towards Goal  Goal: Activity/Safety  Outcome: Progressing Towards Goal  Goal: Consults, if ordered  Outcome: Progressing Towards Goal  Goal: Diagnostic Test/Procedures  Outcome: Progressing Towards Goal  Goal: Nutrition/Diet  Outcome: Progressing Towards Goal  Goal: Medications  Outcome: Progressing Towards Goal  Goal: Respiratory  Outcome: Progressing Towards Goal  Goal: Treatments/Interventions/Procedures  Outcome: Progressing Towards Goal  Goal: Psychosocial  Outcome: Progressing Towards Goal  Goal: *Oxygen saturation within defined limits  Outcome: Progressing Towards Goal  Goal: *Influenza vaccine administered (October-March)  Outcome: Progressing Towards Goal  Goal: *Pneumoccocal vaccine administered  Outcome: Progressing Towards Goal  Goal: *Hemodynamically stable  Outcome: Progressing Towards Goal  Goal: *Demonstrates progressive activity  Outcome: Progressing Towards Goal  Goal: *Tolerating diet  Outcome: Progressing Towards Goal     Problem: Pneumonia: Day 2  Goal: Off Pathway (Use only if patient is Off Pathway)  Outcome: Progressing Towards Goal  Goal: Activity/Safety  Outcome: Progressing Towards Goal  Goal: Consults, if ordered  Outcome: Progressing Towards Goal  Goal: Diagnostic Test/Procedures  Outcome: Progressing Towards Goal  Goal: Nutrition/Diet  Outcome: Progressing Towards Goal  Goal: Discharge Planning  Outcome: Progressing Towards Goal  Goal: Medications  Outcome: Progressing Towards Goal  Goal: Respiratory  Outcome: Progressing Towards Goal  Goal: Treatments/Interventions/Procedures  Outcome: Progressing Towards Goal  Goal: Psychosocial  Outcome: Progressing Towards Goal  Goal: *Oxygen saturation within defined limits  Outcome: Progressing Towards Goal  Goal: *Hemodynamically stable  Outcome: Progressing Towards Goal  Goal: *Demonstrates progressive activity  Outcome: Progressing Towards Goal  Goal: *Tolerating diet  Outcome: Progressing Towards Goal  Goal: *Optimal pain control at patient's stated goal  Outcome: Progressing Towards Goal     Problem: Pneumonia: Day 3  Goal: Off Pathway (Use only if patient is Off Pathway)  Outcome: Progressing Towards Goal  Goal: Activity/Safety  Outcome: Progressing Towards Goal  Goal: Consults, if ordered  Outcome: Progressing Towards Goal  Goal: Diagnostic Test/Procedures  Outcome: Progressing Towards Goal  Goal: Nutrition/Diet  Outcome: Progressing Towards Goal  Goal: Discharge Planning  Outcome: Progressing Towards Goal  Goal: Medications  Outcome: Progressing Towards Goal  Goal: Respiratory  Outcome: Progressing Towards Goal  Goal: Treatments/Interventions/Procedures  Outcome: Progressing Towards Goal  Goal: Psychosocial  Outcome: Progressing Towards Goal  Goal: *Oxygen saturation within defined limits  Outcome: Progressing Towards Goal  Goal: *Hemodynamically stable  Outcome: Progressing Towards Goal  Goal: *Demonstrates progressive activity  Outcome: Progressing Towards Goal  Goal: *Tolerating diet  Outcome: Progressing Towards Goal  Goal: *Describes available resources and support systems  Outcome: Progressing Towards Goal  Goal: *Optimal pain control at patient's stated goal  Outcome: Progressing Towards Goal     Problem: Pneumonia: Day 4  Goal: Off Pathway (Use only if patient is Off Pathway)  Outcome: Progressing Towards Goal  Goal: Activity/Safety  Outcome: Progressing Towards Goal  Goal: Nutrition/Diet  Outcome: Progressing Towards Goal  Goal: Discharge Planning  Outcome: Progressing Towards Goal  Goal: Medications  Outcome: Progressing Towards Goal  Goal: Respiratory  Outcome: Progressing Towards Goal  Goal: Treatments/Interventions/Procedures  Outcome: Progressing Towards Goal  Goal: Psychosocial  Outcome: Progressing Towards Goal     Problem: Pneumonia: Discharge Outcomes  Goal: *Demonstrates progressive activity  Outcome: Progressing Towards Goal  Goal: *Describes follow-up/return visits to physicians  Outcome: Progressing Towards Goal  Goal: *Tolerating diet  Outcome: Progressing Towards Goal  Goal: *Verbalizes name, dosage, time, side effects, and number of days to continue medications  Outcome: Progressing Towards Goal  Goal: *Influenza immunization  Outcome: Progressing Towards Goal  Goal: *Pneumococcal immunization  Outcome: Progressing Towards Goal  Goal: *Respiratory status at baseline  Outcome: Progressing Towards Goal  Goal: *Vital signs within defined limits  Outcome: Progressing Towards Goal  Goal: *Describes available resources and support systems  Outcome: Progressing Towards Goal  Goal: *Optimal pain control at patient's stated goal  Outcome: Progressing Towards Goal

## 2021-09-05 NOTE — PROGRESS NOTES
Patient continues to sit in chair at bedside. Patient reports comfort after cushion placed. NO distress or pain noted at this shift. Oxygen continued at 7L high flow via nasal cannula. Blood sugars managed with insulin this shift. Droplet plus precautions continued. Bed in low position. Call light and belongings within reach. Will give report to night shift nurse.

## 2021-09-06 VITALS
WEIGHT: 226.19 LBS | HEART RATE: 73 BPM | DIASTOLIC BLOOD PRESSURE: 59 MMHG | RESPIRATION RATE: 19 BRPM | OXYGEN SATURATION: 92 % | BODY MASS INDEX: 36.35 KG/M2 | SYSTOLIC BLOOD PRESSURE: 99 MMHG | HEIGHT: 66 IN | TEMPERATURE: 98.8 F

## 2021-09-06 LAB
ALBUMIN SERPL-MCNC: 1.7 G/DL (ref 3.2–4.6)
ALBUMIN/GLOB SERPL: 0.5 {RATIO} (ref 1.2–3.5)
ALP SERPL-CCNC: 59 U/L (ref 50–136)
ALT SERPL-CCNC: 59 U/L (ref 12–65)
ANION GAP SERPL CALC-SCNC: 6 MMOL/L (ref 7–16)
AST SERPL-CCNC: 26 U/L (ref 15–37)
BILIRUB SERPL-MCNC: 0.6 MG/DL (ref 0.2–1.1)
BUN SERPL-MCNC: 15 MG/DL (ref 8–23)
CALCIUM SERPL-MCNC: 8.2 MG/DL (ref 8.3–10.4)
CHLORIDE SERPL-SCNC: 106 MMOL/L (ref 98–107)
CO2 SERPL-SCNC: 29 MMOL/L (ref 21–32)
CREAT SERPL-MCNC: 0.59 MG/DL (ref 0.8–1.5)
D DIMER PPP FEU-MCNC: 3.95 UG/ML(FEU)
ERYTHROCYTE [DISTWIDTH] IN BLOOD BY AUTOMATED COUNT: 13.1 % (ref 11.9–14.6)
GLOBULIN SER CALC-MCNC: 3.5 G/DL (ref 2.3–3.5)
GLUCOSE BLD STRIP.AUTO-MCNC: 107 MG/DL (ref 65–100)
GLUCOSE BLD STRIP.AUTO-MCNC: 115 MG/DL (ref 65–100)
GLUCOSE BLD STRIP.AUTO-MCNC: 64 MG/DL (ref 65–100)
GLUCOSE SERPL-MCNC: 42 MG/DL (ref 65–100)
HCT VFR BLD AUTO: 42.8 % (ref 41.1–50.3)
HGB BLD-MCNC: 14.1 G/DL (ref 13.6–17.2)
MCH RBC QN AUTO: 29.6 PG (ref 26.1–32.9)
MCHC RBC AUTO-ENTMCNC: 32.9 G/DL (ref 31.4–35)
MCV RBC AUTO: 90 FL (ref 79.6–97.8)
NRBC # BLD: 0 K/UL (ref 0–0.2)
PLATELET # BLD AUTO: 141 K/UL
PMV BLD AUTO: 10.4 FL (ref 9.4–12.3)
POTASSIUM SERPL-SCNC: 4.4 MMOL/L (ref 3.5–5.1)
PROT SERPL-MCNC: 5.2 G/DL (ref 6.3–8.2)
RBC # BLD AUTO: 4.75 M/UL (ref 4.23–5.6)
SERVICE CMNT-IMP: ABNORMAL
SODIUM SERPL-SCNC: 141 MMOL/L (ref 138–145)
WBC # BLD AUTO: 13.4 K/UL (ref 4.3–11.1)

## 2021-09-06 PROCEDURE — 94760 N-INVAS EAR/PLS OXIMETRY 1: CPT

## 2021-09-06 PROCEDURE — 80053 COMPREHEN METABOLIC PANEL: CPT

## 2021-09-06 PROCEDURE — 36415 COLL VENOUS BLD VENIPUNCTURE: CPT

## 2021-09-06 PROCEDURE — 94761 N-INVAS EAR/PLS OXIMETRY MLT: CPT

## 2021-09-06 PROCEDURE — 97530 THERAPEUTIC ACTIVITIES: CPT

## 2021-09-06 PROCEDURE — 74011250636 HC RX REV CODE- 250/636: Performed by: FAMILY MEDICINE

## 2021-09-06 PROCEDURE — 74011000250 HC RX REV CODE- 250: Performed by: FAMILY MEDICINE

## 2021-09-06 PROCEDURE — 85027 COMPLETE CBC AUTOMATED: CPT

## 2021-09-06 PROCEDURE — 74011250637 HC RX REV CODE- 250/637: Performed by: FAMILY MEDICINE

## 2021-09-06 PROCEDURE — 82962 GLUCOSE BLOOD TEST: CPT

## 2021-09-06 PROCEDURE — 85379 FIBRIN DEGRADATION QUANT: CPT

## 2021-09-06 RX ORDER — GLIPIZIDE 5 MG/1
5 TABLET ORAL 2 TIMES DAILY
Qty: 60 TABLET | Refills: 0 | Status: SHIPPED | OUTPATIENT
Start: 2021-09-06

## 2021-09-06 RX ORDER — INSULIN GLARGINE 100 [IU]/ML
28 INJECTION, SOLUTION SUBCUTANEOUS
Status: DISCONTINUED | OUTPATIENT
Start: 2021-09-06 | End: 2021-09-06 | Stop reason: HOSPADM

## 2021-09-06 RX ORDER — DEXTROSE 50 % IN WATER (D50W) INTRAVENOUS SYRINGE
25-50 AS NEEDED
Status: DISCONTINUED | OUTPATIENT
Start: 2021-09-06 | End: 2021-09-06 | Stop reason: HOSPADM

## 2021-09-06 RX ORDER — DEXTROSE 40 %
15 GEL (GRAM) ORAL AS NEEDED
Status: DISCONTINUED | OUTPATIENT
Start: 2021-09-06 | End: 2021-09-06 | Stop reason: HOSPADM

## 2021-09-06 RX ORDER — METFORMIN HYDROCHLORIDE 500 MG/1
1000 TABLET ORAL 2 TIMES DAILY WITH MEALS
Qty: 120 TABLET | Refills: 0 | Status: SHIPPED | OUTPATIENT
Start: 2021-09-06 | End: 2021-10-06

## 2021-09-06 RX ORDER — INSULIN GLARGINE 100 [IU]/ML
20 INJECTION, SOLUTION SUBCUTANEOUS
Status: DISCONTINUED | OUTPATIENT
Start: 2021-09-06 | End: 2021-09-06

## 2021-09-06 RX ADMIN — FAMOTIDINE 20 MG: 20 TABLET, FILM COATED ORAL at 07:56

## 2021-09-06 RX ADMIN — DEXAMETHASONE 6 MG: 4 TABLET ORAL at 07:56

## 2021-09-06 RX ADMIN — Medication 10 ML: at 05:14

## 2021-09-06 RX ADMIN — ENOXAPARIN SODIUM 40 MG: 40 INJECTION SUBCUTANEOUS at 01:14

## 2021-09-06 NOTE — PROGRESS NOTES
ACUTE PHYSICAL THERAPY GOALS:  (Developed with and agreed upon by patient and/or caregiver. )  LT. Pt will be able to perform bed mobility and transfers with independence in order to be able to safely function within their home within 7 treatment days. 2. Pt will be able to ambulate a minimum of 250 ft with independence and use of least restrictive device in order to return to home and community ambulation within 7 treatment days. 3. Pt will be able to ambulate up/down 2 stairs with supervision in order to access his/her home safely within 7 treatment days. 4. Pt will demonstrate normal standing and sitting balance with independence in order to safely perform functional mobility, ADLS and decrease fall risk within 7 treatment days. PHYSICAL THERAPY: Daily Note and AM Treatment Day # 3    Francisco Javier Hairston is a 69 y.o. male   PRIMARY DIAGNOSIS: Acute hypoxemic respiratory failure due to COVID-19 ([facility] [address] [facility]  Pneumonia due to COVID-19 virus [U07.1, J12.82]  Hypertension [I10]       ASSESSMENT:     REHAB RECOMMENDATIONS: CURRENT LEVEL OF FUNCTION:  (Most Recently Demonstrated)   Recommendation to date pending progress:  Setting:   No further skilled therapy   Equipment:    To Be Determined Bed Mobility:   Not tested  Sit to Stand:   Supervision  Transfers:   Supervision  Gait/Mobility:   Supervision     ASSESSMENT:  Mr. Wade Jeffrey is up in the chair, wanting to go home. Pt now on RA 94% at rest. Pt supervision for mobility in room, ambulation without assistive device, O2 stats >90% with mobility. Pt supervision for transfers and ambulation. Pt making great progress with activity and toward goals. PT to cont to follow for acute care needs.       SUBJECTIVE:   Mr. Wade Jeffrey states, \"When can I go home\"    SOCIAL HISTORY/ LIVING ENVIRONMENT: Pt lives in a 2 story home with his wife, he resides on the main floor, one significant fall right before admission, no AD use, independent with all ADLs and transfers at baseline OBJECTIVE:     PAIN: VITAL SIGNS: LINES/DRAINS:   Pre Treatment: Pain Screen  Pain Scale 1: Numeric (0 - 10)  Pain Intensity 1: 0  Post Treatment: 0 Vital Signs  O2 Sat (%): 94 %  O2 Device: None (Room air) noen  O2 Device: None (Room air)     MOBILITY: I Mod I S SBA CGA Min Mod Max Total  NT x2 Comments:   Bed Mobility    Rolling [] [] [] [] [] [] [] [] [] [x] []    Supine to Sit [] [] [] [] [] [] [] [] [] [x] []    Scooting [] [] [] [] [] [] [] [] [] [x] []    Sit to Supine [] [] [] [] [] [] [] [] [] [x] []    Transfers    Sit to Stand [] [] [x] [] [] [] [] [] [] [] []    Bed to Chair [] [] [x] [] [] [] [] [] [] [] []    Stand to Sit [] [] [x] [] [] [] [] [] [] [] []    I=Independent, Mod I=Modified Independent, S=Supervision, SBA=Standby Assistance, CGA=Contact Guard Assistance,   Min=Minimal Assistance, Mod=Moderate Assistance, Max=Maximal Assistance, Total=Total Assistance, NT=Not Tested    BALANCE: Good Fair+ Fair Fair- Poor NT Comments   Sitting Static [x] [] [] [] [] []    Sitting Dynamic [x] [] [] [] [] []              Standing Static [x] [] [] [] [] []    Standing Dynamic [] [x] [] [] [] []      GAIT: I Mod I S SBA CGA Min Mod Max Total  NT x2 Comments:   Level of Assistance [] [] [x] [] [] [] [] [] [] [] [] On RA, stats >90%   Distance 100    DME None    Gait Quality Short step length    Weightbearing  Status N/A     I=Independent, Mod I=Modified Independent, S=Supervision, SBA=Standby Assistance, CGA=Contact Guard Assistance,   Min=Minimal Assistance, Mod=Moderate Assistance, Max=Maximal Assistance, Total=Total Assistance, NT=Not Tested    PLAN:   FREQUENCY/DURATION: PT Plan of Care: 3 times/week for duration of hospital stay or until stated goals are met, whichever comes first.  TREATMENT:     TREATMENT:   ($$ Therapeutic Activity: 23-37 mins    )  Therapeutic Activity (23 Minutes):  Therapeutic activity included Transfer Training, Ambulation on level ground, Sitting balance , Standing balance and pursed lip breathing to improve functional Mobility, Strength and Activity tolerance.     TREATMENT GRID:  N/A    AFTER TREATMENT POSITION/PRECAUTIONS:  Chair, Needs within reach and RN notified    INTERDISCIPLINARY COLLABORATION:  RN/PCT and PT/PTA    TOTAL TREATMENT DURATION:  PT Patient Time In/Time Out  Time In: 5243  Time Out: 1201 [address],

## 2021-09-06 NOTE — PROGRESS NOTES
Patient has d/c orders for today. Patient agreeable to [facility]. Referral completed. Family will transport patient home. Patient has met all treatment goals / milestones. CM will continue to monitor. Care Management Interventions  PCP Verified by CM:  Yes  Mode of Transport at Discharge: Self  Discharge Durable Medical Equipment: No  Physical Therapy Consult: Yes  Occupational Therapy Consult: Yes  Current Support Network: Own Home, Lives with Spouse  Confirm Follow Up Transport: Family  Discharge Location  Discharge Placement: Home

## 2021-09-06 NOTE — PROGRESS NOTES
I have reviewed discharge instructions with the patient. The patient verbalized understanding. Lines pulled. Paper rxs given. Patient dressed and awaiting ride. Patient will call the  when ride out front.

## 2021-09-06 NOTE — DISCHARGE SUMMARY
Hospitalist Discharge Summary   Admit Date:  2021  9:30 PM   Name:  Anju Tan   Age:  69 y.o. 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, insomnia type ICD-10-CM: G47.01  ICD-9-CM: 327.01  2021 - Present No        Class 2 severe obesity with serious comorbidity and body mass index (BMI) of 36.0 to 36.9 in adult [facility]) ICD-10-CM: E66.01, Z68.36  ICD-9-CM: 278.01, V85.36  2021 - Present Yes        Type 2 diabetes mellitus with hyperglycemia, without long-term current use of insulin ([facility] ICD-10-CM: E11.65  ICD-9-CM: 250.00, 790.29  2021 - Present Unknown        Hypertension ICD-10-CM: I10  ICD-9-CM: 401.9  2021 - Present Yes        Pneumonia due to COVID-19 virus ICD-10-CM: U07.1, J12.82  ICD-9-CM: 480.8, 079.89  2021 - Present Yes        Thrombocytopenia ([facility] ICD-10-CM: D69.6  ICD-9-CM: 287.5  2021 - Present Yes        * (Principal) Acute hypoxemic respiratory failure due to COVID-19 [facility]) ICD-10-CM: U07.1, J96.01  ICD-9-CM: 518.81, 079.89, 799.02  2021 - Present Yes            Did Patient have Sepsis (YES OR NO): no       Hospital Course:  70 y/o CM with a PMH Obesity, DM HTN who presented to the ER 21with a complaint of cough, SOB,nausea and diarrhea x 1 week. He had been recently diagnosed COVID positive 4 days ago. He reports he has gotten 1/2 COVID vaccines and was scheduled for 2nd a week later. He symptoms progressively worsened prompting him seeking emergency services and subsequent hospital admission. Sats noted to be ~ 85% on EMS arrival. CXR showing nonspecific bilateral lung infiltrates.  He was hospitalized for 10 days, completed decadron therapy and course of remdesivir. He was empirically treated for PNA. He had high oxygen requirements. Weaned from 15L HF to room air. No oxygen requirements indicated per 6 minute walk with RT. His DM is poorly controlled. Increased metformin to 1000 mg BID and added glipizide BID. Patient likely needs insulin OP. Strongly recommend FU with PCP soon for aggressive interventions to prevent further deterioration from obesity and uncontrolled DM with a1c 12     Disposition: Home or Self Care  Diet: ADULT ORAL NUTRITION SUPPLEMENT Breakfast, Lunch, Dinner; Diabetic Supplement  ADULT DIET Regular; 5 carb choices (75 gm/meal)  ADULT DIET Regular; 3 carb choices (45 gm/meal)  Code Status: Full Code    Follow Up Orders: Follow-up Appointments   Procedures    FOLLOW UP VISIT Appointment in: 3 - 5 Days Your diabetes is very uncontrolled putting you at risk for severe infection, heart attacks, stroke, limb amputations, blindness, kidney failure requiring dialysis and risk of rehospitalizations for associ. .. Your diabetes is very uncontrolled putting you at risk for severe infection, heart attacks, stroke, limb amputations, blindness, kidney failure requiring dialysis and risk of rehospitalizations for associated complications from diabetes. Please see your doctor asap. Standing Status:   Standing     Number of Occurrences:   1     Order Specific Question:   Appointment in     Answer:   3 - 5 Days       Follow-up Information     Follow up With Specialties Details Why [address] [place] [address]  Will contact you within 48 hours to schedule home visit. [address] [place] [address].  [phone]    Physician services   You should receive a call from [facility] within 2 days of discharge to schedule an appointment with a primary care doctor.  If you do not hear from them within 48 hours of discharge, please call [phone]. [phone]    Armani Choi MD Internal Medicine   [address] [place] [id number] [phone]            Time spent in patient discharge and coordination 42 minutes. Plan was discussed with patient. All questions answered. Patient was stable at time of discharge. Instructions given to call a physician or return if any concerns. Discharge Info:   Current Discharge Medication List      START taking these medications    Details   glipiZIDE (GLUCOTROL) 5 mg tablet Take 1 Tablet by mouth two (2) times a day. Qty: 60 Tablet, Refills: 0  Start date: 9/6/2021         CONTINUE these medications which have CHANGED    Details   metFORMIN (GLUCOPHAGE) 500 mg tablet Take 2 Tablets by mouth two (2) times daily (with meals) for 30 days. Qty: 120 Tablet, Refills: 0  Start date: 9/6/2021, End date: 10/6/2021         CONTINUE these medications which have NOT CHANGED    Details   multivits,Stress Formula-Zinc tablet Take 1 Tablet by mouth daily. ascorbic acid, vitamin C, (Vitamin C) 500 mg tablet Take 1,000 mg by mouth three (3) times daily. Procedures done this admission:  * No surgery found *    Consults this admission:  None    Echocardiogram/EKG results:  No results found for this visit on 08/27/21. EKG Results     Procedure 720 Value Units Date/Time    EKG, 12 LEAD, INITIAL [[id number]] Collected: 08/27/21 2137    Order Status: Completed Updated: 08/28/21 1013     Ventricular Rate 104 BPM      Atrial Rate 104 BPM      P-R Interval 154 ms      QRS Duration 92 ms      Q-T Interval 364 ms      QTC Calculation (Bezet) 478 ms      Calculated P Axis 32 degrees      Calculated R Axis -41 degrees      Calculated T Axis 22 degrees      Diagnosis --     !! AGE AND GENDER SPECIFIC ECG ANALYSIS !!   Sinus tachycardia with Premature atrial complexes  Left axis deviation  Pulmonary disease pattern  Abnormal ECG  No previous ECGs available  Confirmed by ST JOSHUA CAROLINA MD (), WILL VAZQUEZ ([id number]) on 8/28/2021 10:13:14 AM            Diagnostic Imaging/Tests:   XR CHEST SNGL V    Result Date: 9/1/2021  EXAM: XR CHEST SNGL V INDICATION: hypoixa, COVID COMPARISON: 8/27/2021 FINDINGS: A portable AP radiograph of the chest was obtained at 1205 hours. The patient is on a cardiac monitor. Bilateral groundglass opacities significantly improved. The cardiac and mediastinal contours and pulmonary vascularity are normal.  The bones and soft tissues are grossly within normal limits. Bilateral Covid pneumonia significantly improved. XR CHEST PORT    Result Date: 8/27/2021  CHEST X-RAY, single portable view  8/27/2021 History: Chest pain. Technique: Single frontal view of the chest. Comparison: None. Findings: The cardiac silhouette is mildly enlarged. The lungs are expanded without evidence for pneumothorax. Multifocal bilateral lung infiltrates are seen. These are nonspecific. No significant associated pleural effusion is seen. 1.  Nonspecific bilateral lung infiltrates. Given the associated cardiomegaly, these could represent pulmonary edema. Additional etiologies are not excluded if suggested by clinical findings. This report was made using voice transcription. Despite my best efforts to avoid any, transcription errors may persist. If there is any question about the accuracy of the report or need for clarification, then please call [phone], or text me through perfectserv for clarification or correction. CT CHEST PULMONARY EMBOLISM    Result Date: 8/30/2021  CTA OF THE CHEST - PE STUDY HISTORY: Hypoxia, Suspected Covid-19 Patient COMPARISON: None TECHNIQUE: A helical acquisition was performed through the chest utilizing 9.21VY slice thickness during the infusion of 100 cc of Isovue-370. 3-D post-processed images were created on an independent workstation. Multiplanar reformats were obtained.  The exam was focused on the pulmonary arteries. Dose reduction techniques used: Automated exposure control, adjustment of the mAs and/or kVp according to patient's size, and iterative reconstruction techniques. FINDINGS: *  PULMONARY VESSELS: No evidence of pulmonary embolism. *  PLEURA / PERICARDIUM: Small right pleural effusion. *  KAYODE / MEDIASTINUM: Small hiatal hernia. *  LUNGS: Bilateral groundglass opacities. *  TRACHEOBRONCHIAL TREE: Within normal limits. *  AORTA: Within normal limits. *  CORONARY ARTERIES: Moderate coronary artery calcification is seen. *  CHEST WALL/AXILLA: Within normal limits. *  VISUALIZED UPPER ABDOMEN: Within normal limits. *  SPINE / BONES: Within normal limits. *  ADDITIONAL COMMENTS: None. No evidence of pulmonary embolism. Bilateral Covid pneumonia. Small hiatal hernia. Small right pleural effusion. Coronary artery disease. Date of Dictation: 8/30/2021 9:41 AM      All Micro Results     Procedure Component Value Units Date/Time    Galdino Timmons IGM [[id number]] Collected: 08/28/21 2322    Order Status: Completed Specimen: Serum Updated: 08/31/21 0135     M. pneumoniae Ab, IgM <770 U/mL      Comment: (NOTE)                              Negative            <770  Clinically significant amount of M. pneumoniae antibody  not detected. Low Positive   770 - 12  M. pneumoniae specific IgM presumptively detected. It  is recommended that another sample be collected 1-2  weeks later to assure reactivity. Positive            >950  Highly significant amount of M. pneumoniae specific  IgM antibody detected. Performed At: [address] [id number]  Juanito Munoz MD :[id number]         [facility] [[id number]] Collected: 08/27/21 2330    Order Status: Canceled Specimen: Urine     S. Heidi Presley, UR/CSF [[id number]] Collected: 08/27/21 2330    Order Status: Canceled     MYCOPLASMA AB, IGM [[id number]] Collected: 08/27/21 2325    Order Status: Canceled Specimen: Serum           Labs: Results:       BMP, Mg, Phos Recent Labs     09/06/21  0440 09/05/21  0516    140   K 4.4 4.6    107   CO2 29 29   AGAP 6* 4*   BUN 15 19   CREA 0.59* 0.63*   CA 8.2* 8.5   GLU 42* 80      CBC Recent Labs     09/06/21  0908 09/05/21  0553   WBC 13.4* 13.1*   RBC 4.75 4.93   HGB 14.1 14.4   HCT 42.8 43.5   * 157      LFT Recent Labs     09/06/21  0440   ALT 59   AP 59   TP 5.2*   ALB 1.7*   GLOB 3.5   AGRAT 0.5*      Cardiac Testing No results found for: BNPP, BNP, CPK, RCK1, RCK2, RCK3, RCK4, CKMB, CKNDX, CKND1, TROPT, TROIQ   Coagulation Tests No results found for: PTP, INR, APTT, INREXT   A1c Lab Results   Component Value Date/Time    Hemoglobin A1c 12.2 (H) 09/02/2021 08:10 AM      Lipid Panel No results found for: CHOL, CHOLPOCT, CHOLX, CHLST, CHOLV, 166234, HDL, HDLP, LDL, LDLC, DLDLP, 508287, VLDLC, VLDL, TGLX, TRIGL, TRIGP, TGLPOCT, CHHD, CHHDX   Thyroid Panel No results found for: TSH, T4, FT4, TT3, T3U, TSHEXT     Most Recent UA No results found for: COLOR, APPRN, REFSG, LORIE, PROTU, GLUCU, KETU, BILU, BLDU, UROU, LU, LEUKU, WBCU, RBCU, UEPI, BACTU, CASTS, UCRY, MUCUS, UCOM       All Labs from Last 24 Hrs:  Recent Results (from the past 24 hour(s))   GLUCOSE, POC    Collection Time: 09/05/21  3:57 PM   Result Value Ref Range    Glucose (POC) 224 (H) 65 - 100 mg/dL    Performed by Miriam)    GLUCOSE, POC    Collection Time: 09/05/21  8:04 PM   Result Value Ref Range    Glucose (POC) 109 (H) 65 - 100 mg/dL    Performed by [facility]    METABOLIC PANEL, COMPREHENSIVE    Collection Time: 09/06/21  4:40 AM   Result Value Ref Range    Sodium 141 138 - 145 mmol/L    Potassium 4.4 3.5 - 5.1 mmol/L    Chloride 106 98 - 107 mmol/L    CO2 29 21 - 32 mmol/L    Anion gap 6 (L) 7 - 16 mmol/L    Glucose 42 (L) 65 - 100 mg/dL    BUN 15 8 - 23 MG/DL    Creatinine 0.59 (L) 0.8 - 1.5 MG/DL    GFR est AA >60 >60 ml/min/1.73m2    GFR est non-AA >60 >60 ml/min/1.73m2    Calcium 8.2 (L) 8.3 - 10.4 MG/DL    Bilirubin, total 0.6 0.2 - 1.1 MG/DL    ALT (SGPT) 59 12 - 65 U/L    AST (SGOT) 26 15 - 37 U/L    Alk.  phosphatase 59 50 - 136 U/L    Protein, total 5.2 (L) 6.3 - 8.2 g/dL    Albumin 1.7 (L) 3.2 - 4.6 g/dL    Globulin 3.5 2.3 - 3.5 g/dL    A-G Ratio 0.5 (L) 1.2 - 3.5     D DIMER    Collection Time: 09/06/21  4:40 AM   Result Value Ref Range    D DIMER 3.95 (H) <0.56 ug/ml(FEU)   GLUCOSE, POC    Collection Time: 09/06/21  7:10 AM   Result Value Ref Range    Glucose (POC) 64 (L) 65 - 100 mg/dL    Performed by Miriam)    GLUCOSE, POC    Collection Time: 09/06/21  8:59 AM   Result Value Ref Range    Glucose (POC) 115 (H) 65 - 100 mg/dL    Performed by Leobardo    CBC W/O DIFF    Collection Time: 09/06/21  9:08 AM   Result Value Ref Range    WBC 13.4 (H) 4.3 - 11.1 K/uL    RBC 4.75 4.23 - 5.6 M/uL    HGB 14.1 13.6 - 17.2 g/dL    HCT 42.8 41.1 - 50.3 %    MCV 90.0 79.6 - 97.8 FL    MCH 29.6 26.1 - 32.9 PG    MCHC 32.9 31.4 - 35.0 g/dL    RDW 13.1 11.9 - 14.6 %    PLATELET 416 (L) 468 - 450 K/uL    MPV 10.4 9.4 - 12.3 FL    ABSOLUTE NRBC 0.00 0.0 - 0.2 K/uL   GLUCOSE, POC    Collection Time: 09/06/21 11:03 AM   Result Value Ref Range    Glucose (POC) 107 (H) 65 - 100 mg/dL    Performed by Garrick Martinez)        Current Med List in Hospital:   Current Facility-Administered Medications   Medication Dose Route Frequency    dextrose 40% (GLUTOSE) oral gel 1 Tube  15 g Oral PRN    glucagon (GLUCAGEN) injection 1 mg  1 mg IntraMUSCular PRN    dextrose (D50W) injection syrg 12.5-25 g  25-50 mL IntraVENous PRN    insulin glargine (LANTUS) injection 28 Units  28 Units SubCUTAneous QHS    traZODone (DESYREL) tablet 100 mg  100 mg Oral QHS    famotidine (PEPCID) tablet 20 mg  20 mg Oral BID    dexAMETHasone (DECADRON) tablet 6 mg  6 mg Oral DAILY    enoxaparin (LOVENOX) injection 40 mg  40 mg SubCUTAneous Q12H  sodium chloride (OCEAN) 0.65 % nasal squeeze bottle 2 Spray  2 Spray Both Nostrils PRN    melatonin tablet 5 mg  5 mg Oral QHS    insulin lispro (HUMALOG) injection   SubCUTAneous AC&HS    sodium chloride (NS) flush 5-40 mL  5-40 mL IntraVENous Q8H    sodium chloride (NS) flush 5-40 mL  5-40 mL IntraVENous PRN    acetaminophen (TYLENOL) tablet 650 mg  650 mg Oral Q6H PRN    Or    acetaminophen (TYLENOL) suppository 650 mg  650 mg Rectal Q6H PRN    polyethylene glycol (MIRALAX) packet 17 g  17 g Oral DAILY PRN    ondansetron (ZOFRAN ODT) tablet 4 mg  4 mg Oral Q8H PRN    Or    ondansetron (ZOFRAN) injection 4 mg  4 mg IntraVENous Q6H PRN       No Known Allergies    There is no immunization history on file for this patient. Recent Vital Data:  Patient Vitals for the past 24 hrs:   Temp Pulse Resp BP SpO2   09/06/21 1101 98.8 °F (37.1 °C) 73 19 (!) 99/59 92 %   09/06/21 1028     94 %   09/06/21 0758     94 %   09/06/21 0712 97.7 °F (36.5 °C) 70 20 109/65 95 %   09/06/21 0358 98 °F (36.7 °C) 60 18 99/60 100 %   09/05/21 2255 98.1 °F (36.7 °C) 70 18 (!) 117/57 94 %   09/05/21 2009 97.9 °F (36.6 °C) 72 18 (!) 147/81 100 %   09/05/21 1437 97.5 °F (36.4 °C) 65 18 134/70 99 %     Oxygen Therapy  O2 Sat (%): 92 % (09/06/21 1101)  Pulse via Oximetry: 72 beats per minute (09/06/21 0758)  O2 Device: None (Room air) (09/06/21 1028)  Skin Assessment: Clean, dry, & intact (09/03/21 1958)  Skin Protection for O2 Device: No (08/31/21 0427)  O2 Flow Rate (L/min): 0 l/min (09/06/21 0758)    Estimated body mass index is 36.51 kg/m² as calculated from the following:    Height as of this encounter: 5' 6\" (1.676 m). Weight as of this encounter: 102.6 kg (226 lb 3.1 oz). Intake/Output Summary (Last 24 hours) at 9/6/2021 1155  Last data filed at 9/5/2021 1633  Gross per 24 hour   Intake 480 ml   Output    Net 480 ml         Physical Exam:    General:    Obese. Irritable.  No overt distress  Head:  Normocephalic, atraumatic  Eyes:  Sclerae appear normal.  Pupils equally round. HENT:  Nares appear normal, no drainage. Moist mucous membranes  Neck:  No restricted ROM. Trachea midline  CV:   RRR. No m/r/g. No JVD  Lungs:   CTAB. No wheezing, rhonchi, or rales. Even, unlabored  Abdomen:   Soft, nontender, nondistended. Extremities: Warm and dry. No cyanosis or clubbing. No edema. Skin:     No rashes. Normal coloration  Neuro:  Cranial nerves II-XII grossly intact. Psych: Withdrawn. Irritable       Signed:  Pricilla Abreu DO    Part of this note may have been written by using a voice dictation software. The note has been proof read but may still contain some grammatical/other typographical errors.

## 2021-09-06 NOTE — PROGRESS NOTES
Hourly rounding completed on this shift. No new complaints at this time. All needs met. Pt is currently resting in bed. Will give report to oncoming nurse. Patient continuing to use 8 L High flow oxygen.

## 2021-09-06 NOTE — PROGRESS NOTES
BS was 62 this morning. Gave 4 ounces of apple juice and notified MD who intiated hypoglycemic protocol. Rechecked BS after pt ate food and drank apple juice; BS was 115. Will continue to monitor and administer glucose prn.

## 2021-09-06 NOTE — PROGRESS NOTES
Oxygen Qualifier       Room air: SpO2 with O2 and liter flow   Resting SpO2  94%     Ambulating SpO2  pt refused to walk, he said his O2 doesn't drop when he walks.         Completed by:    Damián Burgess, RT

## 2021-09-07 ENCOUNTER — HOME CARE VISIT (OUTPATIENT)
Dept: SCHEDULING | Facility: HOME HEALTH | Age: 71
End: 2021-09-07
Payer: MEDICARE

## 2021-09-07 PROCEDURE — G0151 HHCP-SERV OF PT,EA 15 MIN: HCPCS

## 2021-09-07 PROCEDURE — 400013 HH SOC

## 2021-09-07 PROCEDURE — 400018 HH-NO PAY CLAIM PROCEDURE

## 2021-09-07 PROCEDURE — 3331090001 HH PPS REVENUE CREDIT

## 2021-09-07 PROCEDURE — 3331090002 HH PPS REVENUE DEBIT

## 2021-09-07 NOTE — PROGRESS NOTES
Patient was discharged home yesterday. CM received notification that patient's oxygen saturation dropped with home health PT today; they are requesting patient be set up with home oxygen. Per qualifier completed 9/6, patient was 94% on RA and he refused an ambulating sat. CM verified with Louis [facility] at [facility] that we would not be able to arrange home oxygen without a walk test. Patient does not have PCP and a [facility] referral was placed prior to discharge. ANA M spoke with Chadd Saucedo with [facility]; she is aware of situation and is trying to find physician that could possibly see patient today; she has spoken with patient and will notify him if she finds an office to accommodate. CM placed call to patient; spoke with his wife and recommended that patient be seen in the ED or at an urgent care; she verbalized understanding and states she will try to get him to ED; he refused previously. CM instructed her to call with further needs.

## 2021-09-08 VITALS
SYSTOLIC BLOOD PRESSURE: 128 MMHG | DIASTOLIC BLOOD PRESSURE: 84 MMHG | HEART RATE: 77 BPM | TEMPERATURE: 98 F | RESPIRATION RATE: 18 BRPM | OXYGEN SATURATION: 94 %

## 2021-09-08 PROCEDURE — 3331090002 HH PPS REVENUE DEBIT

## 2021-09-08 PROCEDURE — 3331090001 HH PPS REVENUE CREDIT

## 2021-09-09 ENCOUNTER — HOME CARE VISIT (OUTPATIENT)
Dept: SCHEDULING | Facility: HOME HEALTH | Age: 71
End: 2021-09-09
Payer: MEDICARE

## 2021-09-09 VITALS
HEART RATE: 100 BPM | DIASTOLIC BLOOD PRESSURE: 68 MMHG | RESPIRATION RATE: 20 BRPM | OXYGEN SATURATION: 90 % | SYSTOLIC BLOOD PRESSURE: 118 MMHG | TEMPERATURE: 97.1 F

## 2021-09-09 PROCEDURE — 3331090001 HH PPS REVENUE CREDIT

## 2021-09-09 PROCEDURE — G0151 HHCP-SERV OF PT,EA 15 MIN: HCPCS

## 2021-09-09 PROCEDURE — 3331090002 HH PPS REVENUE DEBIT

## 2021-09-09 PROCEDURE — G0299 HHS/HOSPICE OF RN EA 15 MIN: HCPCS

## 2021-09-10 PROCEDURE — 3331090001 HH PPS REVENUE CREDIT

## 2021-09-10 PROCEDURE — 3331090002 HH PPS REVENUE DEBIT

## 2021-09-11 ENCOUNTER — HOME CARE VISIT (OUTPATIENT)
Dept: SCHEDULING | Facility: HOME HEALTH | Age: 71
End: 2021-09-11
Payer: MEDICARE

## 2021-09-11 PROCEDURE — G0299 HHS/HOSPICE OF RN EA 15 MIN: HCPCS

## 2021-09-11 PROCEDURE — 3331090001 HH PPS REVENUE CREDIT

## 2021-09-11 PROCEDURE — 3331090002 HH PPS REVENUE DEBIT

## 2021-09-12 VITALS
OXYGEN SATURATION: 93 % | TEMPERATURE: 97.4 F | RESPIRATION RATE: 20 BRPM | SYSTOLIC BLOOD PRESSURE: 128 MMHG | HEART RATE: 72 BPM | DIASTOLIC BLOOD PRESSURE: 76 MMHG

## 2021-09-12 VITALS
HEART RATE: 83 BPM | OXYGEN SATURATION: 95 % | RESPIRATION RATE: 18 BRPM | TEMPERATURE: 98 F | SYSTOLIC BLOOD PRESSURE: 126 MMHG | DIASTOLIC BLOOD PRESSURE: 74 MMHG

## 2021-09-12 PROCEDURE — 3331090002 HH PPS REVENUE DEBIT

## 2021-09-12 PROCEDURE — 3331090001 HH PPS REVENUE CREDIT

## 2021-09-13 PROCEDURE — 3331090001 HH PPS REVENUE CREDIT

## 2021-09-13 PROCEDURE — 3331090002 HH PPS REVENUE DEBIT

## 2021-09-14 ENCOUNTER — HOME CARE VISIT (OUTPATIENT)
Dept: SCHEDULING | Facility: HOME HEALTH | Age: 71
End: 2021-09-14
Payer: MEDICARE

## 2021-09-14 PROCEDURE — 3331090001 HH PPS REVENUE CREDIT

## 2021-09-14 PROCEDURE — 3331090002 HH PPS REVENUE DEBIT

## 2021-09-15 PROCEDURE — 3331090001 HH PPS REVENUE CREDIT

## 2021-09-15 PROCEDURE — 3331090002 HH PPS REVENUE DEBIT

## 2021-09-16 ENCOUNTER — HOME CARE VISIT (OUTPATIENT)
Dept: SCHEDULING | Facility: HOME HEALTH | Age: 71
End: 2021-09-16
Payer: MEDICARE

## 2021-09-16 VITALS
DIASTOLIC BLOOD PRESSURE: 70 MMHG | TEMPERATURE: 98.1 F | OXYGEN SATURATION: 90 % | SYSTOLIC BLOOD PRESSURE: 122 MMHG | RESPIRATION RATE: 20 BRPM | HEART RATE: 76 BPM

## 2021-09-16 PROCEDURE — 3331090001 HH PPS REVENUE CREDIT

## 2021-09-16 PROCEDURE — G0299 HHS/HOSPICE OF RN EA 15 MIN: HCPCS

## 2021-09-16 PROCEDURE — 3331090002 HH PPS REVENUE DEBIT

## 2021-09-17 PROCEDURE — 3331090001 HH PPS REVENUE CREDIT

## 2021-09-17 PROCEDURE — 3331090002 HH PPS REVENUE DEBIT

## 2021-09-18 PROCEDURE — 3331090002 HH PPS REVENUE DEBIT

## 2021-09-18 PROCEDURE — 3331090001 HH PPS REVENUE CREDIT

## 2021-09-19 PROCEDURE — 3331090002 HH PPS REVENUE DEBIT

## 2021-09-19 PROCEDURE — 3331090001 HH PPS REVENUE CREDIT

## 2021-09-20 PROCEDURE — 3331090002 HH PPS REVENUE DEBIT

## 2021-09-20 PROCEDURE — 3331090001 HH PPS REVENUE CREDIT

## 2021-09-21 ENCOUNTER — HOME CARE VISIT (OUTPATIENT)
Dept: HOME HEALTH SERVICES | Facility: HOME HEALTH | Age: 71
End: 2021-09-21
Payer: MEDICARE